# Patient Record
Sex: FEMALE | Race: WHITE | NOT HISPANIC OR LATINO | Employment: OTHER | URBAN - METROPOLITAN AREA
[De-identification: names, ages, dates, MRNs, and addresses within clinical notes are randomized per-mention and may not be internally consistent; named-entity substitution may affect disease eponyms.]

---

## 2017-07-10 ENCOUNTER — APPOINTMENT (OUTPATIENT)
Dept: PREADMISSION TESTING | Facility: HOSPITAL | Age: 77
End: 2017-07-10

## 2017-07-10 VITALS
TEMPERATURE: 98.7 F | WEIGHT: 157 LBS | OXYGEN SATURATION: 98 % | SYSTOLIC BLOOD PRESSURE: 156 MMHG | RESPIRATION RATE: 20 BRPM | HEART RATE: 74 BPM | BODY MASS INDEX: 27.82 KG/M2 | DIASTOLIC BLOOD PRESSURE: 62 MMHG | HEIGHT: 63 IN

## 2017-07-10 LAB
ANION GAP SERPL CALCULATED.3IONS-SCNC: 14.3 MMOL/L
BUN BLD-MCNC: 22 MG/DL (ref 8–23)
BUN/CREAT SERPL: 30.1 (ref 7–25)
CALCIUM SPEC-SCNC: 9.4 MG/DL (ref 8.6–10.5)
CHLORIDE SERPL-SCNC: 97 MMOL/L (ref 98–107)
CO2 SERPL-SCNC: 24.7 MMOL/L (ref 22–29)
CREAT BLD-MCNC: 0.73 MG/DL (ref 0.57–1)
DEPRECATED RDW RBC AUTO: 50.6 FL (ref 37–54)
ERYTHROCYTE [DISTWIDTH] IN BLOOD BY AUTOMATED COUNT: 17.5 % (ref 11.7–13)
GFR SERPL CREATININE-BSD FRML MDRD: 77 ML/MIN/1.73
GLUCOSE BLD-MCNC: 119 MG/DL (ref 65–99)
HCT VFR BLD AUTO: 33.1 % (ref 35.6–45.5)
HGB BLD-MCNC: 10.5 G/DL (ref 11.9–15.5)
MCH RBC QN AUTO: 25.4 PG (ref 26.9–32)
MCHC RBC AUTO-ENTMCNC: 31.7 G/DL (ref 32.4–36.3)
MCV RBC AUTO: 80 FL (ref 80.5–98.2)
PLATELET # BLD AUTO: 250 10*3/MM3 (ref 140–500)
PMV BLD AUTO: 9.2 FL (ref 6–12)
POTASSIUM BLD-SCNC: 4 MMOL/L (ref 3.5–5.2)
RBC # BLD AUTO: 4.14 10*6/MM3 (ref 3.9–5.2)
SODIUM BLD-SCNC: 136 MMOL/L (ref 136–145)
WBC NRBC COR # BLD: 8.24 10*3/MM3 (ref 4.5–10.7)

## 2017-07-10 PROCEDURE — 80048 BASIC METABOLIC PNL TOTAL CA: CPT | Performed by: ORTHOPAEDIC SURGERY

## 2017-07-10 PROCEDURE — 85027 COMPLETE CBC AUTOMATED: CPT | Performed by: ORTHOPAEDIC SURGERY

## 2017-07-10 PROCEDURE — 93005 ELECTROCARDIOGRAM TRACING: CPT

## 2017-07-10 PROCEDURE — 36415 COLL VENOUS BLD VENIPUNCTURE: CPT

## 2017-07-10 RX ORDER — HYDROCODONE BITARTRATE AND ACETAMINOPHEN 10; 325 MG/1; MG/1
1 TABLET ORAL EVERY 6 HOURS PRN
COMMUNITY
End: 2017-09-06

## 2017-07-10 RX ORDER — FUROSEMIDE 40 MG/1
40 TABLET ORAL DAILY
COMMUNITY

## 2017-07-10 RX ORDER — LEVOTHYROXINE SODIUM 88 UG/1
88 TABLET ORAL NIGHTLY
COMMUNITY

## 2017-07-10 RX ORDER — DORZOLAMIDE HYDROCHLORIDE AND TIMOLOL MALEATE 20; 5 MG/ML; MG/ML
1 SOLUTION/ DROPS OPHTHALMIC 2 TIMES DAILY
COMMUNITY

## 2017-07-10 RX ORDER — WARFARIN SODIUM 2 MG/1
2 TABLET ORAL
COMMUNITY

## 2017-07-10 RX ORDER — AMLODIPINE BESYLATE 10 MG/1
10 TABLET ORAL DAILY
COMMUNITY

## 2017-07-10 RX ORDER — LISINOPRIL 40 MG/1
40 TABLET ORAL DAILY
COMMUNITY
End: 2017-07-15 | Stop reason: HOSPADM

## 2017-07-10 RX ORDER — POTASSIUM CHLORIDE 750 MG/1
10 TABLET, EXTENDED RELEASE ORAL DAILY
COMMUNITY

## 2017-07-10 RX ORDER — OMEPRAZOLE 20 MG/1
20 CAPSULE, DELAYED RELEASE ORAL DAILY
COMMUNITY

## 2017-07-10 RX ORDER — ATORVASTATIN CALCIUM 40 MG/1
40 TABLET, FILM COATED ORAL DAILY
COMMUNITY

## 2017-07-10 RX ORDER — CARVEDILOL 3.12 MG/1
3.12 TABLET ORAL 2 TIMES DAILY WITH MEALS
COMMUNITY

## 2017-07-12 ENCOUNTER — ANESTHESIA (OUTPATIENT)
Dept: PERIOP | Facility: HOSPITAL | Age: 77
End: 2017-07-12

## 2017-07-12 ENCOUNTER — HOSPITAL ENCOUNTER (OUTPATIENT)
Facility: HOSPITAL | Age: 77
Setting detail: OBSERVATION
Discharge: HOME | End: 2017-07-15
Attending: ORTHOPAEDIC SURGERY | Admitting: ORTHOPAEDIC SURGERY

## 2017-07-12 ENCOUNTER — ANESTHESIA EVENT (OUTPATIENT)
Dept: PERIOP | Facility: HOSPITAL | Age: 77
End: 2017-07-12

## 2017-07-12 ENCOUNTER — APPOINTMENT (OUTPATIENT)
Dept: GENERAL RADIOLOGY | Facility: HOSPITAL | Age: 77
End: 2017-07-12

## 2017-07-12 DIAGNOSIS — R26.2 DIFFICULTY WALKING: Primary | ICD-10-CM

## 2017-07-12 PROBLEM — S82.899A FRACTURE, ANKLE: Status: ACTIVE | Noted: 2017-07-12

## 2017-07-12 LAB
GLUCOSE BLDC GLUCOMTR-MCNC: 102 MG/DL (ref 70–130)
GLUCOSE BLDC GLUCOMTR-MCNC: 209 MG/DL (ref 70–130)
GLUCOSE BLDC GLUCOMTR-MCNC: 63 MG/DL (ref 70–130)
GLUCOSE BLDC GLUCOMTR-MCNC: 76 MG/DL (ref 70–130)
GLUCOSE BLDC GLUCOMTR-MCNC: 85 MG/DL (ref 70–130)
INR PPP: 1.12 (ref 0.9–1.1)
PROTHROMBIN TIME: 14 SECONDS (ref 11.7–14.2)

## 2017-07-12 PROCEDURE — C1713 ANCHOR/SCREW BN/BN,TIS/BN: HCPCS | Performed by: ORTHOPAEDIC SURGERY

## 2017-07-12 PROCEDURE — 73610 X-RAY EXAM OF ANKLE: CPT

## 2017-07-12 PROCEDURE — 25010000002 MIDAZOLAM PER 1 MG: Performed by: ANESTHESIOLOGY

## 2017-07-12 PROCEDURE — 82962 GLUCOSE BLOOD TEST: CPT

## 2017-07-12 PROCEDURE — 25010000002 PHENYLEPHRINE PER 1 ML: Performed by: ANESTHESIOLOGY

## 2017-07-12 PROCEDURE — 25010000002 FENTANYL CITRATE (PF) 100 MCG/2ML SOLUTION: Performed by: ANESTHESIOLOGY

## 2017-07-12 PROCEDURE — 25010000002 ROPIVACAINE PER 1 MG: Performed by: ANESTHESIOLOGY

## 2017-07-12 PROCEDURE — 85610 PROTHROMBIN TIME: CPT | Performed by: ORTHOPAEDIC SURGERY

## 2017-07-12 PROCEDURE — 25010000002 PROPOFOL 10 MG/ML EMULSION: Performed by: ANESTHESIOLOGY

## 2017-07-12 PROCEDURE — G0378 HOSPITAL OBSERVATION PER HR: HCPCS

## 2017-07-12 PROCEDURE — 76000 FLUOROSCOPY <1 HR PHYS/QHP: CPT

## 2017-07-12 PROCEDURE — 63710000001 INSULIN DETEMER PER 5 UNITS: Performed by: ORTHOPAEDIC SURGERY

## 2017-07-12 DEVICE — IMPLANTABLE DEVICE: Type: IMPLANTABLE DEVICE | Status: FUNCTIONAL

## 2017-07-12 DEVICE — IMPLANTABLE DEVICE
Type: IMPLANTABLE DEVICE | Site: ANKLE | Status: FUNCTIONAL
Brand: EQUIVABONE ®

## 2017-07-12 RX ORDER — OXYCODONE AND ACETAMINOPHEN 7.5; 325 MG/1; MG/1
1 TABLET ORAL ONCE AS NEEDED
Status: DISCONTINUED | OUTPATIENT
Start: 2017-07-12 | End: 2017-07-12 | Stop reason: HOSPADM

## 2017-07-12 RX ORDER — PROPOFOL 10 MG/ML
VIAL (ML) INTRAVENOUS AS NEEDED
Status: DISCONTINUED | OUTPATIENT
Start: 2017-07-12 | End: 2017-07-12 | Stop reason: SURG

## 2017-07-12 RX ORDER — PROMETHAZINE HYDROCHLORIDE 25 MG/ML
12.5 INJECTION, SOLUTION INTRAMUSCULAR; INTRAVENOUS ONCE AS NEEDED
Status: DISCONTINUED | OUTPATIENT
Start: 2017-07-12 | End: 2017-07-12 | Stop reason: HOSPADM

## 2017-07-12 RX ORDER — LIDOCAINE HYDROCHLORIDE 20 MG/ML
INJECTION, SOLUTION INFILTRATION; PERINEURAL AS NEEDED
Status: DISCONTINUED | OUTPATIENT
Start: 2017-07-12 | End: 2017-07-12 | Stop reason: SURG

## 2017-07-12 RX ORDER — VANCOMYCIN HYDROCHLORIDE 1 G/200ML
1000 INJECTION, SOLUTION INTRAVENOUS ONCE
Status: CANCELLED | OUTPATIENT
Start: 2017-07-12

## 2017-07-12 RX ORDER — HYDROMORPHONE HYDROCHLORIDE 1 MG/ML
0.5 INJECTION, SOLUTION INTRAMUSCULAR; INTRAVENOUS; SUBCUTANEOUS
Status: DISCONTINUED | OUTPATIENT
Start: 2017-07-12 | End: 2017-07-12 | Stop reason: HOSPADM

## 2017-07-12 RX ORDER — LABETALOL HYDROCHLORIDE 5 MG/ML
5 INJECTION, SOLUTION INTRAVENOUS
Status: DISCONTINUED | OUTPATIENT
Start: 2017-07-12 | End: 2017-07-12 | Stop reason: HOSPADM

## 2017-07-12 RX ORDER — MORPHINE SULFATE 2 MG/ML
2 INJECTION, SOLUTION INTRAMUSCULAR; INTRAVENOUS EVERY 4 HOURS PRN
Status: DISCONTINUED | OUTPATIENT
Start: 2017-07-12 | End: 2017-07-15 | Stop reason: HOSPADM

## 2017-07-12 RX ORDER — FLUMAZENIL 0.1 MG/ML
0.2 INJECTION INTRAVENOUS AS NEEDED
Status: DISCONTINUED | OUTPATIENT
Start: 2017-07-12 | End: 2017-07-12 | Stop reason: HOSPADM

## 2017-07-12 RX ORDER — DEXTROSE MONOHYDRATE 25 G/50ML
25 INJECTION, SOLUTION INTRAVENOUS ONCE
Status: COMPLETED | OUTPATIENT
Start: 2017-07-12 | End: 2017-07-12

## 2017-07-12 RX ORDER — SODIUM CHLORIDE 0.9 % (FLUSH) 0.9 %
1-10 SYRINGE (ML) INJECTION AS NEEDED
Status: DISCONTINUED | OUTPATIENT
Start: 2017-07-12 | End: 2017-07-12 | Stop reason: HOSPADM

## 2017-07-12 RX ORDER — OXYCODONE AND ACETAMINOPHEN 7.5; 325 MG/1; MG/1
1 TABLET ORAL EVERY 4 HOURS PRN
Status: DISCONTINUED | OUTPATIENT
Start: 2017-07-12 | End: 2017-07-15 | Stop reason: HOSPADM

## 2017-07-12 RX ORDER — DORZOLAMIDE HYDROCHLORIDE AND TIMOLOL MALEATE 20; 5 MG/ML; MG/ML
1 SOLUTION/ DROPS OPHTHALMIC 2 TIMES DAILY
Status: DISCONTINUED | OUTPATIENT
Start: 2017-07-12 | End: 2017-07-15 | Stop reason: HOSPADM

## 2017-07-12 RX ORDER — SODIUM CHLORIDE, SODIUM LACTATE, POTASSIUM CHLORIDE, CALCIUM CHLORIDE 600; 310; 30; 20 MG/100ML; MG/100ML; MG/100ML; MG/100ML
9 INJECTION, SOLUTION INTRAVENOUS CONTINUOUS
Status: DISCONTINUED | OUTPATIENT
Start: 2017-07-12 | End: 2017-07-12

## 2017-07-12 RX ORDER — ROPIVACAINE HYDROCHLORIDE 5 MG/ML
INJECTION, SOLUTION EPIDURAL; INFILTRATION; PERINEURAL AS NEEDED
Status: DISCONTINUED | OUTPATIENT
Start: 2017-07-12 | End: 2017-07-12 | Stop reason: SURG

## 2017-07-12 RX ORDER — LISINOPRIL 40 MG/1
40 TABLET ORAL DAILY
Status: DISCONTINUED | OUTPATIENT
Start: 2017-07-13 | End: 2017-07-15 | Stop reason: HOSPADM

## 2017-07-12 RX ORDER — WARFARIN SODIUM 2 MG/1
2 TABLET ORAL
Status: DISCONTINUED | OUTPATIENT
Start: 2017-07-12 | End: 2017-07-15 | Stop reason: HOSPADM

## 2017-07-12 RX ORDER — PROMETHAZINE HYDROCHLORIDE 25 MG/1
25 SUPPOSITORY RECTAL ONCE AS NEEDED
Status: DISCONTINUED | OUTPATIENT
Start: 2017-07-12 | End: 2017-07-12 | Stop reason: HOSPADM

## 2017-07-12 RX ORDER — ONDANSETRON 2 MG/ML
4 INJECTION INTRAMUSCULAR; INTRAVENOUS ONCE AS NEEDED
Status: DISCONTINUED | OUTPATIENT
Start: 2017-07-12 | End: 2017-07-12 | Stop reason: HOSPADM

## 2017-07-12 RX ORDER — NALOXONE HCL 0.4 MG/ML
0.2 VIAL (ML) INJECTION AS NEEDED
Status: DISCONTINUED | OUTPATIENT
Start: 2017-07-12 | End: 2017-07-12 | Stop reason: HOSPADM

## 2017-07-12 RX ORDER — FENTANYL CITRATE 50 UG/ML
50 INJECTION, SOLUTION INTRAMUSCULAR; INTRAVENOUS
Status: DISCONTINUED | OUTPATIENT
Start: 2017-07-12 | End: 2017-07-12 | Stop reason: HOSPADM

## 2017-07-12 RX ORDER — SODIUM CHLORIDE, SODIUM LACTATE, POTASSIUM CHLORIDE, CALCIUM CHLORIDE 600; 310; 30; 20 MG/100ML; MG/100ML; MG/100ML; MG/100ML
50 INJECTION, SOLUTION INTRAVENOUS CONTINUOUS
Status: DISCONTINUED | OUTPATIENT
Start: 2017-07-12 | End: 2017-07-13

## 2017-07-12 RX ORDER — LEVOTHYROXINE SODIUM 88 UG/1
88 TABLET ORAL NIGHTLY
Status: DISCONTINUED | OUTPATIENT
Start: 2017-07-12 | End: 2017-07-15 | Stop reason: HOSPADM

## 2017-07-12 RX ORDER — MIDAZOLAM HYDROCHLORIDE 1 MG/ML
2 INJECTION INTRAMUSCULAR; INTRAVENOUS
Status: DISCONTINUED | OUTPATIENT
Start: 2017-07-12 | End: 2017-07-12 | Stop reason: HOSPADM

## 2017-07-12 RX ORDER — HYDRALAZINE HYDROCHLORIDE 20 MG/ML
5 INJECTION INTRAMUSCULAR; INTRAVENOUS
Status: DISCONTINUED | OUTPATIENT
Start: 2017-07-12 | End: 2017-07-12 | Stop reason: HOSPADM

## 2017-07-12 RX ORDER — ATORVASTATIN CALCIUM 20 MG/1
40 TABLET, FILM COATED ORAL DAILY
Status: DISCONTINUED | OUTPATIENT
Start: 2017-07-13 | End: 2017-07-15 | Stop reason: HOSPADM

## 2017-07-12 RX ORDER — HYDROCODONE BITARTRATE AND ACETAMINOPHEN 10; 325 MG/1; MG/1
1 TABLET ORAL EVERY 6 HOURS PRN
Status: DISCONTINUED | OUTPATIENT
Start: 2017-07-12 | End: 2017-07-15 | Stop reason: HOSPADM

## 2017-07-12 RX ORDER — MAGNESIUM HYDROXIDE 1200 MG/15ML
LIQUID ORAL AS NEEDED
Status: DISCONTINUED | OUTPATIENT
Start: 2017-07-12 | End: 2017-07-12 | Stop reason: HOSPADM

## 2017-07-12 RX ORDER — CARVEDILOL 3.12 MG/1
3.12 TABLET ORAL 2 TIMES DAILY WITH MEALS
Status: DISCONTINUED | OUTPATIENT
Start: 2017-07-12 | End: 2017-07-15 | Stop reason: HOSPADM

## 2017-07-12 RX ORDER — HYDROCODONE BITARTRATE AND ACETAMINOPHEN 7.5; 325 MG/1; MG/1
1 TABLET ORAL ONCE AS NEEDED
Status: DISCONTINUED | OUTPATIENT
Start: 2017-07-12 | End: 2017-07-12 | Stop reason: HOSPADM

## 2017-07-12 RX ORDER — DEXTROSE MONOHYDRATE 25 G/50ML
25 INJECTION, SOLUTION INTRAVENOUS ONCE
Status: DISCONTINUED | OUTPATIENT
Start: 2017-07-12 | End: 2017-07-12

## 2017-07-12 RX ORDER — DEXTROSE MONOHYDRATE 25 G/50ML
12.5 INJECTION, SOLUTION INTRAVENOUS AS NEEDED
Status: DISCONTINUED | OUTPATIENT
Start: 2017-07-12 | End: 2017-07-12 | Stop reason: HOSPADM

## 2017-07-12 RX ORDER — FUROSEMIDE 40 MG/1
40 TABLET ORAL DAILY
Status: DISCONTINUED | OUTPATIENT
Start: 2017-07-13 | End: 2017-07-15 | Stop reason: HOSPADM

## 2017-07-12 RX ORDER — NALOXONE HCL 0.4 MG/ML
0.4 VIAL (ML) INJECTION
Status: DISCONTINUED | OUTPATIENT
Start: 2017-07-12 | End: 2017-07-15 | Stop reason: HOSPADM

## 2017-07-12 RX ORDER — FAMOTIDINE 10 MG/ML
20 INJECTION, SOLUTION INTRAVENOUS ONCE
Status: COMPLETED | OUTPATIENT
Start: 2017-07-12 | End: 2017-07-12

## 2017-07-12 RX ORDER — ZOLPIDEM TARTRATE 5 MG/1
5 TABLET ORAL NIGHTLY PRN
Status: DISCONTINUED | OUTPATIENT
Start: 2017-07-12 | End: 2017-07-15 | Stop reason: HOSPADM

## 2017-07-12 RX ORDER — ROPIVACAINE HYDROCHLORIDE 2 MG/ML
INJECTION, SOLUTION EPIDURAL; INFILTRATION; PERINEURAL AS NEEDED
Status: DISCONTINUED | OUTPATIENT
Start: 2017-07-12 | End: 2017-07-12 | Stop reason: SURG

## 2017-07-12 RX ORDER — PANTOPRAZOLE SODIUM 40 MG/1
40 TABLET, DELAYED RELEASE ORAL EVERY MORNING
Status: DISCONTINUED | OUTPATIENT
Start: 2017-07-13 | End: 2017-07-15 | Stop reason: HOSPADM

## 2017-07-12 RX ORDER — EPHEDRINE SULFATE 50 MG/ML
5 INJECTION, SOLUTION INTRAVENOUS ONCE AS NEEDED
Status: DISCONTINUED | OUTPATIENT
Start: 2017-07-12 | End: 2017-07-12 | Stop reason: HOSPADM

## 2017-07-12 RX ORDER — MIDAZOLAM HYDROCHLORIDE 1 MG/ML
1 INJECTION INTRAMUSCULAR; INTRAVENOUS
Status: DISCONTINUED | OUTPATIENT
Start: 2017-07-12 | End: 2017-07-12 | Stop reason: HOSPADM

## 2017-07-12 RX ORDER — AMLODIPINE BESYLATE 10 MG/1
10 TABLET ORAL DAILY
Status: DISCONTINUED | OUTPATIENT
Start: 2017-07-13 | End: 2017-07-15 | Stop reason: HOSPADM

## 2017-07-12 RX ORDER — PROMETHAZINE HYDROCHLORIDE 25 MG/1
12.5 TABLET ORAL ONCE AS NEEDED
Status: DISCONTINUED | OUTPATIENT
Start: 2017-07-12 | End: 2017-07-12 | Stop reason: HOSPADM

## 2017-07-12 RX ORDER — POTASSIUM CHLORIDE 750 MG/1
10 CAPSULE, EXTENDED RELEASE ORAL DAILY
Status: DISCONTINUED | OUTPATIENT
Start: 2017-07-13 | End: 2017-07-15 | Stop reason: HOSPADM

## 2017-07-12 RX ORDER — DIPHENHYDRAMINE HYDROCHLORIDE 50 MG/ML
12.5 INJECTION INTRAMUSCULAR; INTRAVENOUS
Status: DISCONTINUED | OUTPATIENT
Start: 2017-07-12 | End: 2017-07-12 | Stop reason: HOSPADM

## 2017-07-12 RX ORDER — PROMETHAZINE HYDROCHLORIDE 25 MG/1
25 TABLET ORAL ONCE AS NEEDED
Status: DISCONTINUED | OUTPATIENT
Start: 2017-07-12 | End: 2017-07-12 | Stop reason: HOSPADM

## 2017-07-12 RX ADMIN — SODIUM CHLORIDE, POTASSIUM CHLORIDE, SODIUM LACTATE AND CALCIUM CHLORIDE 50 ML/HR: 600; 310; 30; 20 INJECTION, SOLUTION INTRAVENOUS at 23:50

## 2017-07-12 RX ADMIN — DEXTROSE MONOHYDRATE 25 ML: 25 INJECTION, SOLUTION INTRAVENOUS at 15:35

## 2017-07-12 RX ADMIN — PHENYLEPHRINE HYDROCHLORIDE 50 MCG: 10 INJECTION INTRAVENOUS at 18:19

## 2017-07-12 RX ADMIN — INSULIN DETEMIR 30 UNITS: 100 INJECTION, SOLUTION SUBCUTANEOUS at 23:45

## 2017-07-12 RX ADMIN — ROPIVACAINE HYDROCHLORIDE 10 ML: 2 INJECTION, SOLUTION EPIDURAL; INFILTRATION at 16:13

## 2017-07-12 RX ADMIN — LEVOTHYROXINE SODIUM 88 MCG: 88 TABLET ORAL at 23:49

## 2017-07-12 RX ADMIN — SODIUM CHLORIDE, POTASSIUM CHLORIDE, SODIUM LACTATE AND CALCIUM CHLORIDE: 600; 310; 30; 20 INJECTION, SOLUTION INTRAVENOUS at 17:45

## 2017-07-12 RX ADMIN — SODIUM CHLORIDE, POTASSIUM CHLORIDE, SODIUM LACTATE AND CALCIUM CHLORIDE 9 ML/HR: 600; 310; 30; 20 INJECTION, SOLUTION INTRAVENOUS at 20:33

## 2017-07-12 RX ADMIN — CARVEDILOL 3.12 MG: 3.12 TABLET, FILM COATED ORAL at 23:47

## 2017-07-12 RX ADMIN — LIDOCAINE HYDROCHLORIDE 60 MG: 20 INJECTION, SOLUTION INFILTRATION; PERINEURAL at 17:47

## 2017-07-12 RX ADMIN — WARFARIN SODIUM 2 MG: 2 TABLET ORAL at 23:49

## 2017-07-12 RX ADMIN — FENTANYL CITRATE 50 MCG: 50 INJECTION INTRAMUSCULAR; INTRAVENOUS at 17:47

## 2017-07-12 RX ADMIN — SODIUM CHLORIDE, POTASSIUM CHLORIDE, SODIUM LACTATE AND CALCIUM CHLORIDE 9 ML/HR: 600; 310; 30; 20 INJECTION, SOLUTION INTRAVENOUS at 13:46

## 2017-07-12 RX ADMIN — PROPOFOL 100 MG: 10 INJECTION, EMULSION INTRAVENOUS at 17:47

## 2017-07-12 RX ADMIN — PHENYLEPHRINE HYDROCHLORIDE 80 MCG: 10 INJECTION INTRAVENOUS at 18:43

## 2017-07-12 RX ADMIN — DEXTROSE MONOHYDRATE 12.5 G: 25 INJECTION, SOLUTION INTRAVENOUS at 13:45

## 2017-07-12 RX ADMIN — DORZOLAMIDE HYDROCHLORIDE AND TIMOLOL MALEATE 1 DROP: 20; 5 SOLUTION/ DROPS OPHTHALMIC at 23:50

## 2017-07-12 RX ADMIN — FENTANYL CITRATE 50 MCG: 50 INJECTION INTRAMUSCULAR; INTRAVENOUS at 16:03

## 2017-07-12 RX ADMIN — MIDAZOLAM 1 MG: 1 INJECTION INTRAMUSCULAR; INTRAVENOUS at 16:02

## 2017-07-12 RX ADMIN — ROPIVACAINE HYDROCHLORIDE 10 ML: 2 INJECTION, SOLUTION EPIDURAL; INFILTRATION at 16:05

## 2017-07-12 RX ADMIN — PHENYLEPHRINE HYDROCHLORIDE 50 MCG: 10 INJECTION INTRAVENOUS at 18:35

## 2017-07-12 RX ADMIN — FENTANYL CITRATE 25 MCG: 50 INJECTION INTRAMUSCULAR; INTRAVENOUS at 13:57

## 2017-07-12 RX ADMIN — MIDAZOLAM 1 MG: 1 INJECTION INTRAMUSCULAR; INTRAVENOUS at 13:48

## 2017-07-12 RX ADMIN — ROPIVACAINE HYDROCHLORIDE 10 ML: 5 INJECTION, SOLUTION EPIDURAL; INFILTRATION; PERINEURAL at 16:13

## 2017-07-12 RX ADMIN — FAMOTIDINE 20 MG: 10 INJECTION INTRAVENOUS at 13:48

## 2017-07-12 RX ADMIN — ROPIVACAINE HYDROCHLORIDE 15 ML: 5 INJECTION, SOLUTION EPIDURAL; INFILTRATION; PERINEURAL at 16:05

## 2017-07-12 NOTE — ANESTHESIA PROCEDURE NOTES
Peripheral Block    Patient location during procedure: holding area  Start time: 7/12/2017 4:01 PM  Stop time: 7/12/2017 4:06 PM  Reason for block: at surgeon's request and post-op pain management  Performed by  Anesthesiologist: CARLOS CHINCHILLA  Preanesthetic Checklist  Completed: patient identified, site marked, surgical consent, pre-op evaluation, timeout performed, IV checked, risks and benefits discussed and monitors and equipment checked  Peripheral Block Prep:  Sterile barriers:gloves  Prep: ChloraPrep  Patient monitoring: blood pressure monitoring, continuous pulse oximetry and EKG  Peripheral Procedure  Sedation:yes  Guidance:ultrasound guided  Images:still images obtained    Laterality:right  Block Type:popliteal  Injection Technique:single-shot  Needle Type:echogenic  Needle Gauge:22 G  ULTRASOUND INTERPRETATION.  Using ultrasound guidance a 22 G gauge needle was placed in close proximity to the sciatic nerve, at which point, under ultrasound guidance anesthetic was injected in the area of the nerve and spread of the anesthesia was seen on ultrasound in close proximity thereto.  There were no abnormalities seen on ultrasound; a digital image was taken; and the patient tolerated the procedure with no complications.   Medications  Local Injected:ropivacaine 0.2% and ropivacaine 0.5% Local Amount Injected:25mL  Post Assessment  Injection Assessment: negative aspiration for heme, no paresthesia on injection and incremental injection  Patient Tolerance:comfortable throughout block  Complications:no

## 2017-07-12 NOTE — PLAN OF CARE
Problem: Patient Care Overview (Adult)  Goal: Plan of Care Review  Outcome: Ongoing (interventions implemented as appropriate)    07/12/17 1312   Coping/Psychosocial Response Interventions   Plan Of Care Reviewed With patient   Patient Care Overview   Progress no change       Goal: Adult Individualization and Mutuality  Outcome: Ongoing (interventions implemented as appropriate)    07/12/17 1312   Individualization   Patient Specific Preferences PT GOES BY STIVEN       Goal: Discharge Needs Assessment  Outcome: Ongoing (interventions implemented as appropriate)    07/12/17 1312   Self-Care   Equipment Currently Used at Home shower chair;wheelchair;walker, rolling;power chair, (recliner lift);cane, straight;commode;glucometer         Problem: Perioperative Period (Adult)  Goal: Signs and Symptoms of Listed Potential Problems Will be Absent or Manageable (Perioperative Period)  Outcome: Ongoing (interventions implemented as appropriate)    07/12/17 1312   Perioperative Period   Problems Assessed (Perioperative Period) pain;infection   Problems Present (Perioperative Period) pain

## 2017-07-12 NOTE — ANESTHESIA PROCEDURE NOTES
Airway  Urgency: elective    Airway not difficult    General Information and Staff    Patient location during procedure: OR  Anesthesiologist: LOREE YANG    Indications and Patient Condition  Indications for airway management: airway protection    Preoxygenated: yes  MILS maintained throughout  Mask difficulty assessment: 1 - vent by mask    Final Airway Details  Final airway type: supraglottic airway      Successful airway: classic      Number of attempts at approach: 1

## 2017-07-12 NOTE — ANESTHESIA PREPROCEDURE EVALUATION
Anesthesia Evaluation     Patient summary reviewed          Airway   Mallampati: III  TM distance: >3 FB  Neck ROM: full  no difficulty expected  Dental    (+) upper dentures and lower dentures    Pulmonary    (+) decreased breath sounds,   Cardiovascular   Exercise tolerance: good (4-7 METS)    Rhythm: regular  Rate: normal    (+) CABG,       Neuro/Psych  GI/Hepatic/Renal/Endo      Musculoskeletal     Abdominal    Substance History      OB/GYN          Other                                        Anesthesia Plan    ASA 3     general     intravenous induction   Anesthetic plan and risks discussed with patient.

## 2017-07-12 NOTE — H&P
History & Physical       Patient: Cele Owens    Date of Admission: 7/12/2017 12:15 PM    YOB: 1940    Medical Record Number: [id number]    Attending Physician: Sriram De Luna MD        Chief Complaints: Fracture, ankle, right, closed, with nonunion, subsequent encounter [S82.321K]      History of Present Illness: 77 y.o. female presents with Fracture, ankle, right, closed, with nonunion, subsequent encounter [S82.271K]. She is s/p ORIF/ex fix, debridement and removal of some hardware.  Still with apparent non-union and some failure of implants. Patient is now being admitted to the services of Sriram De Luna MD for further evaluation and treatment.     Allergies:   Allergies   Allergen Reactions   • Aspirin Swelling     EYE SWELLING   • Iodinated Diagnostic Agents Swelling and Other (See Comments)     SWELLING, KIDNEY PROBLEMS   • Penicillins Other (See Comments)     TACHYCARDIA, LIP NUMBNESS   • Sulfa Antibiotics Nausea And Vomiting       Medications:   Home Medications:  No current facility-administered medications on file prior to encounter.      No current outpatient prescriptions on file prior to encounter.     Current Medications:  Scheduled Meds:   Continuous Infusions:  lactated ringers 9 mL/hr Last Rate: 9 mL/hr (07/12/17 1346)     PRN Meds:.•  dextrose  •  fentanyl  •  midazolam **OR** midazolam  •  sodium chloride    Past Medical History:   Diagnosis Date   • Ankle pain, right    • Arthritis    • Atrial fibrillation    • COPD (chronic obstructive pulmonary disease)     NO INHALERS   • Coronary artery disease    • Diabetes mellitus    • Frequent UTI    • GERD (gastroesophageal reflux disease)    • Glaucoma    • High cholesterol    • History of MI (myocardial infarction) 2011   • Hypertension    • Hypothyroidism    • On anticoagulant therapy         Past Surgical History:   Procedure Laterality Date   • ANKLE SURGERY Right     X4 WITH HARDWARE PLACEMENT   • APPENDECTOMY     • BLADDER  SUSPENSION     • CATARACT EXTRACTION, BILATERAL     • CHOLECYSTECTOMY     • CORONARY ARTERY BYPASS GRAFT  2011    4 VESSEL   • HYSTERECTOMY     • ORIF ANKLE FRACTURE Right    • PARTIAL HIP ARTHROPLASTY Right 2014   • SPINE SURGERY  03/2016   • TONSILLECTOMY     • TOTAL HIP ARTHROPLASTY Right 2016        Social History     Occupational History   • Not on file.     Social History Main Topics   • Smoking status: Former Smoker     Packs/day: 1.00     Years: 30.00     Types: Cigarettes     Quit date: 2011   • Smokeless tobacco: Not on file   • Alcohol use No   • Drug use: No   • Sexual activity: Not on file    Social History     Social History Narrative        Family History   Problem Relation Age of Onset   • Malig Hyperthermia Neg Hx          Review of Systems:   HEENT: Patient denies any headaches, vision changes, change in hearing, or tinnitus, Patient denies any rhinorrhea,epistaxis, sinus pain, mouth or dental problems, sore throat or hoarseness, or dysphagia  Pulmonary: Patient denies any cough, congestion, SOA, or wheezing  Cardiovascular: Patient denies any chest pain, dyspnea, palpitations, weakness, intolerance of exercise, varicosities, swelling of extremities, known murmur  Gastrointestinal:  Patient denies nausea, vomiting, diarrhea, constipation, loss  of appetite, change in appetite, dysphagia, gas, heartburn, melena, change in bowel habits, use of laxatives or other drugs to alter the function of the gastrointestinal tract.  Genital/Urinary: Patient denies dysuria, change in color of urine, change in frequency of urination, pain with urgency, incontinence, retention, or nocturia.  Musculoskeletal: Patient denies increased warmth; redness; or swelling of joints; limitation of function; deformity; crepitation: pain in a joint or an extremity, the neck, or the back, especially with movement.  Neurological: Patient denies dizziness, tremor, ataxia, difficulty in speaking, change in speech, paresthesia, loss  of sensation, seizures, syncope, changes in memory.  Endocrine system: Patient denies tremors, palpitations, intolerance of heat or cold, polyuria, polydipsia, polyphagia, diaphoresis, exophthalmos, or goiter.  Psychological: Patient denies thoughts/plans or harming self or other; depression,  insomnia, night terrors, terrance, memory loss, disorientation.  Skin: Patient denies any bruising, rashes, discoloration, pruritus, wounds, ulcers, decubiti, changes in the hair or nails  Hematopoietic: Patient denies history of spontaneous or excessive bleeding, epistaxis, hematuria, melena, fatigue, enlarged or tender lymph nodes, pallor, history of anemia.    Physical Exam: 77 y.o. female  General Appearance:    Alert, cooperative, in no acute distress                    Vitals:    07/12/17 1239 07/12/17 1352   BP: 102/62    BP Location: Right arm    Patient Position: Lying    Pulse: 75 72   Resp: 20    Temp: 98.3 °F (36.8 °C)    TempSrc: Oral    SpO2: 98% 98%        Head:    Normocephalic, without obvious abnormality, atraumatic   Eyes:            Lids and lashes normal, conjunctivae and sclerae normal, no   icterus, no pallor, corneas clear, PERRLA   Ears:    Ears appear intact with no abnormalities noted   Throat:   No oral lesions, no thrush, oral mucosa moist   Neck:   No adenopathy, supple, trachea midline, no thyromegaly, no   carotid bruit, no JVD   Back:     No kyphosis present, no scoliosis present, no skin lesions,      erythema or scars, no tenderness to percussion or                   palpation,   range of motion normal   Lungs:     Clear to auscultation,respirations regular, even and                  unlabored    Heart:    Regular rhythm and normal rate, normal S1 and S2, no            murmur, no gallop, no rub, no click   Chest Wall:    No abnormalities observed   Abdomen:     Normal bowel sounds, no masses, no organomegaly, soft        non-tender, non-distended, no guarding, no rebound                 tenderness   Rectal:     Deferred   Extremities:   Tenderness over lateral ankle; some varus. Loss of moderate dorsiflexion and plantar flexion; no drainage; wounds healedMoves all extremities well, no edema,   no cyanosis, no redness   Pulses:   Pulses palpable and equal bilaterally   Skin:   No bleeding, bruising or rash   Lymph nodes:   No palpable adenopathy   Neurologic:   Cranial nerves 2 - 12 grossly intact, sensation intact, DTR       present and equal bilaterally           Diagnostic Tests:  [unfilled]      [unfilled]    Assessment:  Patient Active Problem List   Diagnosis   • Fracture, ankle           Plan:  The patient voiced understanding of the risks, benefits, and alternative forms of treatment that were discussed and the patient consents to proceed with removal of failed implants and repair non-union medial malleolus.       Discharge Plan: tomorrow or following day to home      Date: [unfilled]  Sriram De Luna MD

## 2017-07-12 NOTE — ANESTHESIA PROCEDURE NOTES
Peripheral Block    Patient location during procedure: holding area  Start time: 7/12/2017 4:07 PM  Stop time: 7/12/2017 4:15 PM  Reason for block: at surgeon's request and post-op pain management  Performed by  Anesthesiologist: CARLOS CHINCHILLA  Preanesthetic Checklist  Completed: patient identified, site marked, surgical consent, pre-op evaluation, timeout performed, IV checked, risks and benefits discussed and monitors and equipment checked  Peripheral Block Prep:  Sterile barriers:gloves  Prep: ChloraPrep  Patient monitoring: blood pressure monitoring, continuous pulse oximetry and EKG  Peripheral Procedure  Sedation:yes  Guidance:ultrasound guided  Images:still images obtained    Laterality:right  Block Type:saphenous  Injection Technique:single-shot  Needle Type:echogenic  Needle Gauge:22 G  ULTRASOUND INTERPRETATION.  Using ultrasound guidance a 22 G gauge needle was placed in close proximity to the femoral nerve, at which point, under ultrasound guidance anesthetic was injected in the area of the nerve and spread of the anesthesia was seen on ultrasound in close proximity thereto.  There were no abnormalities seen on ultrasound; a digital image was taken; and the patient tolerated the procedure with no complications.   Medications  Local Injected:ropivacaine 0.2% and ropivacaine 0.5% Local Amount Injected:20mL  Post Assessment  Injection Assessment: negative aspiration for heme, no paresthesia on injection and incremental injection  Patient Tolerance:comfortable throughout block  Complications:no

## 2017-07-13 PROBLEM — R26.2 DIFFICULTY WALKING: Status: ACTIVE | Noted: 2017-07-13

## 2017-07-13 LAB
ANION GAP SERPL CALCULATED.3IONS-SCNC: 13.5 MMOL/L
BASOPHILS # BLD AUTO: 0 10*3/MM3 (ref 0–0.2)
BASOPHILS NFR BLD AUTO: 0 % (ref 0–1.5)
BUN BLD-MCNC: 23 MG/DL (ref 8–23)
BUN/CREAT SERPL: 27.4 (ref 7–25)
CALCIUM SPEC-SCNC: 8.6 MG/DL (ref 8.6–10.5)
CHLORIDE SERPL-SCNC: 97 MMOL/L (ref 98–107)
CO2 SERPL-SCNC: 23.5 MMOL/L (ref 22–29)
CREAT BLD-MCNC: 0.84 MG/DL (ref 0.57–1)
DEPRECATED RDW RBC AUTO: 50.2 FL (ref 37–54)
EOSINOPHIL # BLD AUTO: 0 10*3/MM3 (ref 0–0.7)
EOSINOPHIL NFR BLD AUTO: 0 % (ref 0.3–6.2)
ERYTHROCYTE [DISTWIDTH] IN BLOOD BY AUTOMATED COUNT: 17.4 % (ref 11.7–13)
GFR SERPL CREATININE-BSD FRML MDRD: 66 ML/MIN/1.73
GLUCOSE BLD-MCNC: 244 MG/DL (ref 65–99)
GLUCOSE BLDC GLUCOMTR-MCNC: 144 MG/DL (ref 70–130)
GLUCOSE BLDC GLUCOMTR-MCNC: 169 MG/DL (ref 70–130)
GLUCOSE BLDC GLUCOMTR-MCNC: 185 MG/DL (ref 70–130)
GLUCOSE BLDC GLUCOMTR-MCNC: 218 MG/DL (ref 70–130)
HBA1C MFR BLD: 6.51 % (ref 4.8–5.6)
HCT VFR BLD AUTO: 30.4 % (ref 35.6–45.5)
HGB BLD-MCNC: 9.5 G/DL (ref 11.9–15.5)
IMM GRANULOCYTES # BLD: 0.03 10*3/MM3 (ref 0–0.03)
IMM GRANULOCYTES NFR BLD: 0.3 % (ref 0–0.5)
INR PPP: 1.08 (ref 0.9–1.1)
LYMPHOCYTES # BLD AUTO: 0.65 10*3/MM3 (ref 0.9–4.8)
LYMPHOCYTES NFR BLD AUTO: 6.1 % (ref 19.6–45.3)
MCH RBC QN AUTO: 24.9 PG (ref 26.9–32)
MCHC RBC AUTO-ENTMCNC: 31.3 G/DL (ref 32.4–36.3)
MCV RBC AUTO: 79.6 FL (ref 80.5–98.2)
MONOCYTES # BLD AUTO: 0.57 10*3/MM3 (ref 0.2–1.2)
MONOCYTES NFR BLD AUTO: 5.3 % (ref 5–12)
NEUTROPHILS # BLD AUTO: 9.48 10*3/MM3 (ref 1.9–8.1)
NEUTROPHILS NFR BLD AUTO: 88.3 % (ref 42.7–76)
PLATELET # BLD AUTO: 246 10*3/MM3 (ref 140–500)
PMV BLD AUTO: 9.4 FL (ref 6–12)
POTASSIUM BLD-SCNC: 4.2 MMOL/L (ref 3.5–5.2)
PROTHROMBIN TIME: 13.6 SECONDS (ref 11.7–14.2)
RBC # BLD AUTO: 3.82 10*6/MM3 (ref 3.9–5.2)
SODIUM BLD-SCNC: 134 MMOL/L (ref 136–145)
WBC NRBC COR # BLD: 10.73 10*3/MM3 (ref 4.5–10.7)

## 2017-07-13 PROCEDURE — 85025 COMPLETE CBC W/AUTO DIFF WBC: CPT | Performed by: ORTHOPAEDIC SURGERY

## 2017-07-13 PROCEDURE — 80048 BASIC METABOLIC PNL TOTAL CA: CPT | Performed by: ORTHOPAEDIC SURGERY

## 2017-07-13 PROCEDURE — G8980 MOBILITY D/C STATUS: HCPCS

## 2017-07-13 PROCEDURE — 82962 GLUCOSE BLOOD TEST: CPT

## 2017-07-13 PROCEDURE — G8978 MOBILITY CURRENT STATUS: HCPCS

## 2017-07-13 PROCEDURE — G0378 HOSPITAL OBSERVATION PER HR: HCPCS

## 2017-07-13 PROCEDURE — 94799 UNLISTED PULMONARY SVC/PX: CPT

## 2017-07-13 PROCEDURE — 63710000001 INSULIN DETEMER PER 5 UNITS: Performed by: ORTHOPAEDIC SURGERY

## 2017-07-13 PROCEDURE — 83036 HEMOGLOBIN GLYCOSYLATED A1C: CPT | Performed by: INTERNAL MEDICINE

## 2017-07-13 PROCEDURE — G8979 MOBILITY GOAL STATUS: HCPCS

## 2017-07-13 PROCEDURE — 85610 PROTHROMBIN TIME: CPT | Performed by: ORTHOPAEDIC SURGERY

## 2017-07-13 PROCEDURE — 97110 THERAPEUTIC EXERCISES: CPT

## 2017-07-13 PROCEDURE — 25010000002 MORPHINE PER 10 MG: Performed by: ORTHOPAEDIC SURGERY

## 2017-07-13 PROCEDURE — 97162 PT EVAL MOD COMPLEX 30 MIN: CPT

## 2017-07-13 RX ORDER — ACETAMINOPHEN 160 MG/5ML
500 SOLUTION ORAL EVERY 6 HOURS PRN
Status: DISCONTINUED | OUTPATIENT
Start: 2017-07-13 | End: 2017-07-13

## 2017-07-13 RX ADMIN — DORZOLAMIDE HYDROCHLORIDE AND TIMOLOL MALEATE 1 DROP: 20; 5 SOLUTION/ DROPS OPHTHALMIC at 20:07

## 2017-07-13 RX ADMIN — CARVEDILOL 3.12 MG: 3.12 TABLET, FILM COATED ORAL at 09:05

## 2017-07-13 RX ADMIN — HYDROCODONE BITARTRATE AND ACETAMINOPHEN 1 TABLET: 10; 325 TABLET ORAL at 10:56

## 2017-07-13 RX ADMIN — OXYCODONE HYDROCHLORIDE AND ACETAMINOPHEN 1 TABLET: 7.5; 325 TABLET ORAL at 21:05

## 2017-07-13 RX ADMIN — PANTOPRAZOLE SODIUM 40 MG: 40 TABLET, DELAYED RELEASE ORAL at 09:05

## 2017-07-13 RX ADMIN — LEVOTHYROXINE SODIUM 88 MCG: 88 TABLET ORAL at 20:07

## 2017-07-13 RX ADMIN — AMLODIPINE BESYLATE 10 MG: 10 TABLET ORAL at 09:05

## 2017-07-13 RX ADMIN — WARFARIN SODIUM 2 MG: 2 TABLET ORAL at 17:21

## 2017-07-13 RX ADMIN — FUROSEMIDE 40 MG: 40 TABLET ORAL at 09:05

## 2017-07-13 RX ADMIN — POTASSIUM CHLORIDE 10 MEQ: 750 CAPSULE, EXTENDED RELEASE ORAL at 09:05

## 2017-07-13 RX ADMIN — CARVEDILOL 3.12 MG: 3.12 TABLET, FILM COATED ORAL at 17:21

## 2017-07-13 RX ADMIN — INSULIN DETEMIR 30 UNITS: 100 INJECTION, SOLUTION SUBCUTANEOUS at 21:46

## 2017-07-13 RX ADMIN — ATORVASTATIN CALCIUM 40 MG: 20 TABLET, FILM COATED ORAL at 20:06

## 2017-07-13 RX ADMIN — OXYCODONE HYDROCHLORIDE AND ACETAMINOPHEN 1 TABLET: 7.5; 325 TABLET ORAL at 16:39

## 2017-07-13 RX ADMIN — INSULIN DETEMIR 30 UNITS: 100 INJECTION, SOLUTION SUBCUTANEOUS at 09:02

## 2017-07-13 RX ADMIN — DORZOLAMIDE HYDROCHLORIDE AND TIMOLOL MALEATE 1 DROP: 20; 5 SOLUTION/ DROPS OPHTHALMIC at 09:05

## 2017-07-13 RX ADMIN — METFORMIN HYDROCHLORIDE 1000 MG: 1000 TABLET ORAL at 09:05

## 2017-07-13 RX ADMIN — LISINOPRIL 40 MG: 40 TABLET ORAL at 09:05

## 2017-07-13 RX ADMIN — MORPHINE SULFATE 2 MG: 2 INJECTION, SOLUTION INTRAMUSCULAR; INTRAVENOUS at 19:50

## 2017-07-13 NOTE — SIGNIFICANT NOTE
07/13/17 1023   Rehab Treatment   Discipline physical therapist   Rehab Evaluation   Evaluation Not Performed patient/family declined evaluation  (Pt requests PT to wait on eval this am due to diarrhea. WIll follow up later today.)   Recommendation   PT - Next Appointment 07/13/17

## 2017-07-13 NOTE — ANESTHESIA POSTPROCEDURE EVALUATION
Patient: Cele Owens    Procedure Summary     Date Anesthesia Start Anesthesia Stop Room / Location    07/12/17 1744 [facility] / [facility]       Procedure Diagnosis Surgeon Provider    HARDWARE REMOVAL WITH FIXATION MEDIAL MALLELOUS RIGHT  (Right Ankle) No diagnosis on file. MD Bud Lancaster MD          Anesthesia Type: general  Last vitals  /44 (07/12/17 2000)    Temp 37.2 °C (98.9 °F) (07/12/17 1944)    Pulse 73 (07/12/17 2000)   Resp 16 (07/12/17 2000)    SpO2 99 % (07/12/17 2000)      Post Anesthesia Care and Evaluation    Patient location during evaluation: PACU  Patient participation: complete - patient participated  Level of consciousness: awake and alert  Pain management: adequate  Airway patency: patent  Anesthetic complications: No anesthetic complications    Cardiovascular status: acceptable  Respiratory status: acceptable  Hydration status: acceptable

## 2017-07-13 NOTE — PLAN OF CARE
Problem: Patient Care Overview (Adult)  Goal: Plan of Care Review  Outcome: Ongoing (interventions implemented as appropriate)    07/13/17 0438   Coping/Psychosocial Response Interventions   Plan Of Care Reviewed With patient   Patient Care Overview   Progress progress toward functional goals as expected   Outcome Evaluation   Outcome Summary/Follow up Plan PAIN UNDER CONTROL. UP ASSIST X1. VSS. EDUCATION PROVIDED ON THE SIGNS AND SYMPTOMS OF COPD LIKE WHEEZING AND SHORTNESS OF AIR.       Goal: Adult Individualization and Mutuality  Outcome: Ongoing (interventions implemented as appropriate)  Goal: Discharge Needs Assessment  Outcome: Ongoing (interventions implemented as appropriate)    Problem: Perioperative Period (Adult)  Goal: Signs and Symptoms of Listed Potential Problems Will be Absent or Manageable (Perioperative Period)  Outcome: Ongoing (interventions implemented as appropriate)    Problem: Fall Risk (Adult)  Goal: Identify Related Risk Factors and Signs and Symptoms  Outcome: Outcome(s) achieved Date Met:  07/13/17  Goal: Absence of Falls  Outcome: Ongoing (interventions implemented as appropriate)    Problem: Pain, Acute (Adult)  Goal: Identify Related Risk Factors and Signs and Symptoms  Outcome: Outcome(s) achieved Date Met:  07/13/17  Goal: Acceptable Pain Control/Comfort Level  Outcome: Ongoing (interventions implemented as appropriate)

## 2017-07-13 NOTE — PROGRESS NOTES
"    Orthopedic Progress Note      Patient: Cele Owens    Date of Admission: 7/12/2017 12:15 PM    YOB: 1940    Medical Record Number: [id number]    Attending Physician: Sriram De Luna MD    Post Operative Day Number: 1    Status Post: HARDWARE REMOVAL WITH FIXATION MEDIAL MALLELOUS RIGHT      Current Medications:  Scheduled Meds:  amLODIPine 10 mg Oral Daily   atorvastatin 40 mg Oral Daily   carvedilol 3.125 mg Oral BID With Meals   dorzolamide-timolol 1 drop Both Eyes BID   furosemide 40 mg Oral Daily   insulin detemir 30 Units Subcutaneous Q12H   levothyroxine 88 mcg Oral Nightly   lisinopril 40 mg Oral Daily   pantoprazole 40 mg Oral QAM   potassium chloride 10 mEq Oral Daily   warfarin 2 mg Oral Daily     Continuous Infusions:   PRN Meds:.HYDROcodone-acetaminophen  •  Morphine **AND** naloxone  •  oxyCODONE-acetaminophen  •  zolpidem      Physical Exam: 77 y.o. female  General Appearance:    Alert, cooperative, in no acute distress                 Vitals:    07/13/17 0719 07/13/17 1157 07/13/17 1212 07/13/17 1553   BP: 171/68 125/57  158/61   BP Location: Right arm Left arm  Right arm   Patient Position: Lying Sitting  Lying   Pulse: 80 80  85   Resp: 16 16  18   Temp: 100.5 °F (38.1 °C) 99.4 °F (37.4 °C) Comment: encouraged IS every 2 hours 98.7 °F (37.1 °C)   TempSrc: Oral Oral  Oral   SpO2: 100% 100%  99%   Weight: 157 lb (71.2 kg)      Height: 63\" (160 cm)           Extremities:   Dressing Dry and Intact    Incision intact without signs or symptoms of infections   Pulses:   Pulses palpable and equal bilaterally   Skin:   No bleeding, bruising or rash       Diagnostic Tests:    Admission on 07/12/2017   Component Date Value Ref Range Status   • Glucose 07/12/2017 63* 70 - 130 mg/dL Final   • Protime 07/12/2017 14.0  11.7 - 14.2 Seconds Final   • INR 07/12/2017 1.12* 0.90 - 1.10 Final   • Glucose 07/12/2017 102  70 - 130 mg/dL Final   • Glucose 07/12/2017 76  70 - 130 mg/dL Final   • Glucose " 07/12/2017 85  70 - 130 mg/dL Final   • Glucose 07/12/2017 209* 70 - 130 mg/dL Final   • Glucose 07/13/2017 244* 65 - 99 mg/dL Final   • BUN 07/13/2017 23  8 - 23 mg/dL Final   • Creatinine 07/13/2017 0.84  0.57 - 1.00 mg/dL Final   • Sodium 07/13/2017 134* 136 - 145 mmol/L Final   • Potassium 07/13/2017 4.2  3.5 - 5.2 mmol/L Final   • Chloride 07/13/2017 97* 98 - 107 mmol/L Final   • CO2 07/13/2017 23.5  22.0 - 29.0 mmol/L Final   • Calcium 07/13/2017 8.6  8.6 - 10.5 mg/dL Final   • eGFR Non African Amer 07/13/2017 66  >60 mL/min/1.73 Final   • BUN/Creatinine Ratio 07/13/2017 27.4* 7.0 - 25.0 Final   • Anion Gap 07/13/2017 13.5  mmol/L Final   • Protime 07/13/2017 13.6  11.7 - 14.2 Seconds Final   • INR 07/13/2017 1.08  0.90 - 1.10 Final   • WBC 07/13/2017 10.73* 4.50 - 10.70 10*3/mm3 Final   • RBC 07/13/2017 3.82* 3.90 - 5.20 10*6/mm3 Final   • Hemoglobin 07/13/2017 9.5* 11.9 - 15.5 g/dL Final   • Hematocrit 07/13/2017 30.4* 35.6 - 45.5 % Final   • MCV 07/13/2017 79.6* 80.5 - 98.2 fL Final   • MCH 07/13/2017 24.9* 26.9 - 32.0 pg Final   • MCHC 07/13/2017 31.3* 32.4 - 36.3 g/dL Final   • RDW 07/13/2017 17.4* 11.7 - 13.0 % Final   • RDW-SD 07/13/2017 50.2  37.0 - 54.0 fl Final   • MPV 07/13/2017 9.4  6.0 - 12.0 fL Final   • Platelets 07/13/2017 246  140 - 500 10*3/mm3 Final   • Neutrophil % 07/13/2017 88.3* 42.7 - 76.0 % Final   • Lymphocyte % 07/13/2017 6.1* 19.6 - 45.3 % Final   • Monocyte % 07/13/2017 5.3  5.0 - 12.0 % Final   • Eosinophil % 07/13/2017 0.0* 0.3 - 6.2 % Final   • Basophil % 07/13/2017 0.0  0.0 - 1.5 % Final   • Immature Grans % 07/13/2017 0.3  0.0 - 0.5 % Final   • Neutrophils, Absolute 07/13/2017 9.48* 1.90 - 8.10 10*3/mm3 Final   • Lymphocytes, Absolute 07/13/2017 0.65* 0.90 - 4.80 10*3/mm3 Final   • Monocytes, Absolute 07/13/2017 0.57  0.20 - 1.20 10*3/mm3 Final   • Eosinophils, Absolute 07/13/2017 0.00  0.00 - 0.70 10*3/mm3 Final   • Basophils, Absolute 07/13/2017 0.00  0.00 - 0.20 10*3/mm3  Final   • Immature Grans, Absolute 07/13/2017 0.03  0.00 - 0.03 10*3/mm3 Final   • Glucose 07/13/2017 218* 70 - 130 mg/dL Final   • Glucose 07/13/2017 144* 70 - 130 mg/dL Final   • Hemoglobin A1C 07/13/2017 6.51* 4.80 - 5.60 % Final   • Glucose 07/13/2017 169* 70 - 130 mg/dL Final       Xr Ankle 3+ View Right    Result Date: 7/12/2017  Narrative: 3 VIEW PORTABLE RIGHT ANKLE  HISTORY: Internal fixation of fracture.  FINDINGS: Imaging in the operating room shows 3 screws transfixing a fracture of the medial malleolus. 3 images were obtained and the fluoroscopy time measures 42 seconds.  This report was finalized on 7/12/2017 8:33 PM by Dr. Kyle Galeas MD.      Fl C Arm During Surgery    Result Date: 7/12/2017  Narrative: This procedure was auto-finalized with no dictation required.      Assessment:  Patient Active Problem List   Diagnosis   • Fracture, ankle   • Difficulty walking           Plan:    Continue Pain management efforts  Continue mobilization efforts  Continue incisional care  Hospitalists following for diarrhea      Discharge Plan: tomorrow to home if diarrhea resolved      Date: 7/13/2017    Sriram De Luna MD

## 2017-07-13 NOTE — BRIEF OP NOTE
ANKLE OPEN REDUCTION INTERNAL FIXATION  Procedure Note    Cele Owens  7/12/2017    Pre-op Diagnosis:   Non-union right ankle fracture; failure of fixation    Post-op Diagnosis:     Same    Procedure/CPT® Codes: 23763      Procedure(s):  HARDWARE REMOVAL TIBIA AND TALUS WITH REPAIR NONUNION MEDIAL MALLELOUS WITH INTERNAL FIXATION AND GRAFT (EQUIVABONE)    Surgeon(s):  Sriram De Luna MD    Anesthesia: General    Staff:   Circulator: Erendira Davis RN; Yash Patino RN  Radiology Technologist: Mckenzie Gonsales  Scrub Person: Blair Mac; Shari Batres    Estimated Blood Loss: *No blood loss documented*  Urine Voided: * No values recorded between 7/12/2017  5:40 PM and 7/12/2017  7:42 PM *    Specimens:                * No specimens in log *      Drains:           Findings: DICTATED    Complications: NONE      Sriram De Luna MD     Date: 7/12/2017  Time: 8:12 PM

## 2017-07-13 NOTE — CONSULTS
Patient Identification:  Name: Cele Owens  Age/Sex: 77 y.o. female  :  1940  MRN: [id number]         Primary Care Physician: Cedric Ramirez MD  Room:  P798/1              Date of Consult: 17    Requesting Physician: Dr. Sriram De Luna    Reason for Consultation: Management of diabetes and other issues as noted    HPI: This is a 77-year-old woman whom we are asked to see for management of underlying diabetes and heart disease.  She has been having diarrhea today and has had 6 stools so far.  This morning she said she had some fever and what sounds like chills.  She had nausea this morning did not eat breakfast.  She did eat lunch.  No abdominal pain.  No dizziness or lightheadedness.  She sometimes has loose bowel movements depending on what she eats, for example onions will cause loose bowel movements but not usually as bad as what's going on today  Regarding her diabetes, its very well controlled.  She checks it twice a day.  Sugars run between 90 and 100.  Her sugar was low preoperatively.  If her sugars are low, she gets shaky and sweaty.  Occasionally she'll have dry mouth.  No polyuria or polydipsia.  She has paresthesias in her fingers at times but she attributes that to carpal tunnel.    Past Medical History:   Diagnosis Date   • Ankle pain, right    • Arthritis    • Atrial fibrillation    • COPD (chronic obstructive pulmonary disease)     NO INHALERS   • Coronary artery disease    • Diabetes mellitus    • Frequent UTI    • GERD (gastroesophageal reflux disease)    • Glaucoma    • High cholesterol    • History of MI (myocardial infarction)    • Hypertension    • Hypothyroidism    • On anticoagulant therapy        Past Surgical History:   Procedure Laterality Date   • ANKLE OPEN REDUCTION INTERNAL FIXATION Right 2017    Procedure: HARDWARE REMOVAL WITH FIXATION MEDIAL MALLELOUS RIGHT ;  Surgeon: Sriram De Luna MD;  Location: [facility];  Service:    • ANKLE SURGERY Right      X4 WITH HARDWARE PLACEMENT   • APPENDECTOMY     • BLADDER SUSPENSION     • CATARACT EXTRACTION, BILATERAL     • CHOLECYSTECTOMY     • CORONARY ARTERY BYPASS GRAFT  2011    4 VESSEL   • HYSTERECTOMY     • ORIF ANKLE FRACTURE Right    • PARTIAL HIP ARTHROPLASTY Right 2014   • SPINE SURGERY  03/2016   • TONSILLECTOMY     • TOTAL HIP ARTHROPLASTY Right 2016       Prescriptions Prior to Admission   Medication Sig Dispense Refill Last Dose   • amLODIPine (NORVASC) 10 MG tablet Take 10 mg by mouth Daily.   7/12/2017 at 0700   • atorvastatin (LIPITOR) 40 MG tablet Take 40 mg by mouth Daily.   7/11/2017 at 0900   • carvedilol (COREG) 3.125 MG tablet Take 3.125 mg by mouth 2 (Two) Times a Day With Meals.   7/12/2017 at 0700   • dorzolamide-timolol (COSOPT) 22.3-6.8 MG/ML ophthalmic solution Administer 1 drop to both eyes 2 (Two) Times a Day.   7/12/2017 at 0700   • furosemide (LASIX) 40 MG tablet Take 40 mg by mouth Daily.   7/11/2017 at 0900   • HYDROcodone-acetaminophen (NORCO)  MG per tablet Take 1 tablet by mouth Every 6 (Six) Hours As Needed for Moderate Pain (4-6).   7/11/2017 at 2200   • Insulin Glargine (LANTUS SOLOSTAR) 100 UNIT/ML injection pen Inject 35 Units under the skin 2 (Two) Times a Day.   7/11/2017 at 2200   • levothyroxine (SYNTHROID, LEVOTHROID) 88 MCG tablet Take 88 mcg by mouth Every Night.   7/11/2017 at 2200   • lisinopril (PRINIVIL,ZESTRIL) 40 MG tablet Take 40 mg by mouth Daily.   7/11/2017 at 0900   • metFORMIN (GLUCOPHAGE) 1000 MG tablet Take 1,000 mg by mouth Daily With Breakfast.   7/11/2017 at 0900   • omeprazole (priLOSEC) 20 MG capsule Take 20 mg by mouth Daily.   7/11/2017 at 0900   • potassium chloride (K-DUR,KLOR-CON) 10 MEQ CR tablet Take 10 mEq by mouth Daily.   7/11/2017 at 0900   • warfarin (COUMADIN) 2 MG tablet Take 2 mg by mouth Daily. HELD FOR OR   7/6/2017     Allergies:  Aspirin; Iodinated diagnostic agents; Penicillins; and Sulfa antibiotics    Social History    Substance Use Topics   • Smoking status: Former Smoker     Packs/day: 1.00     Years: 30.00     Types: Cigarettes     Quit date: 2011   • Smokeless tobacco: None   • Alcohol use No   .  Lives with her .  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  She also owned a ceramics shop.    Family History   Problem Relation Age of Onset   • Malig Hyperthermia Neg Hx    Positive for diabetes in several family members.  Her brother and father had stomach cancer.  A cousin had a stroke.  Her father and brother had heart disease    Review of Systems   Constitutional: Positive for activity change, appetite change, chills, fever and unexpected weight change. Negative for diaphoresis.        She has lost about 50 pounds.  This started earlier this year with ankle surgery.  She just wasn't hungry and was satisfied with less.  Weight loss has plateaued over the last several weeks   HENT: Positive for voice change. Negative for congestion, hearing loss, sore throat and trouble swallowing.         Voice has been hoarse since her heart surgery when she extubated herself   Eyes: Negative for visual disturbance.   Respiratory: Negative for cough, choking, chest tightness, shortness of breath, wheezing and stridor.    Cardiovascular: Positive for leg swelling. Negative for chest pain and palpitations.        She has a little bit of swelling in the left ankle but it's not been any worse than usual.  Knees are achy especially the right  Had palpitations until her heart rate medication was increased   Gastrointestinal: Positive for diarrhea and nausea. Negative for abdominal pain, constipation and vomiting.   Endocrine: Negative for polydipsia, polyphagia and polyuria.   Genitourinary: Negative for difficulty urinating and dysuria.   Musculoskeletal: Positive for arthralgias and joint swelling. Negative for back pain.        Right ankle swelling   Neurological: Positive for numbness. Negative for dizziness, tremors, syncope and  weakness.   Psychiatric/Behavioral: Negative for agitation and behavioral problems.          Vital Signs  Temp:  [96.3 °F (35.7 °C)-100.5 °F (38.1 °C)] 99.4 °F (37.4 °C)  Heart Rate:  [66-86] 80  Resp:  [15-21] 16  BP: (125-171)/(44-73) 125/57  Body mass index is 27.81 kg/(m^2).    Physical Exam   Constitutional: She is oriented to person, place, and time. She appears well-developed and well-nourished. No distress.   Looks younger than her age.  Pleasant and cooperative   HENT:   Head: Normocephalic and atraumatic.   Right Ear: External ear normal.   Left Ear: External ear normal.   Nose: Nose normal.   Mouth/Throat: Oropharynx is clear and moist. No oropharyngeal exudate.   Upper dentures   Eyes: Conjunctivae and EOM are normal. Pupils are equal, round, and reactive to light. Right eye exhibits no discharge. Left eye exhibits no discharge. No scleral icterus.   Neck: Normal range of motion. Neck supple. No tracheal deviation present. No thyromegaly present.   Transmission of heart murmur to the right neck   Cardiovascular: Normal rate, regular rhythm and intact distal pulses.  Exam reveals no friction rub.    Murmur heard.  Unable to check pedal pulses on the right secondary to the cast.  Grade 3/6 systolic murmur best heard right upper sternal border   Pulmonary/Chest: No respiratory distress. She has no wheezes. She has no rales. She exhibits no tenderness.   Breath sounds equal and clear though diminished   Abdominal: Soft. Bowel sounds are normal. She exhibits no distension and no mass. There is no tenderness.   Obese.  No hepatosplenomegaly   Musculoskeletal: She exhibits edema.   Trace edema at the left ankle.  Right leg has a cast.  Minimal thickening of the PIP joints.   strength is decreased but equal.  Tinel's sign is negative   Lymphadenopathy:     She has no cervical adenopathy.   Neurological: She is alert and oriented to person, place, and time. No cranial nerve deficit.   Sensation decreased in  the distal left lower extremity to the plastic filament   Skin: Skin is warm. She is not diaphoretic.   Psychiatric: She has a normal mood and affect.   Nursing note and vitals reviewed.      Results Review:    I reviewed the patient's new clinical results.    Assessment/Plan  1.  Diarrhea.  Because she had fever and chills this morning, I have ordered a C. difficile study.  She was on prolonged IV antibiotics earlier this year because of the ankle.  I have stopped the metformin.  2.  Diabetes mellitus type 2.  Her sugars at home are actually low.  I have ordered an A1c.  Stop metformin for now.  We'll continue with the Levemir.  That dosage had already been decreased because of hypoglycemia at home.  3.  Hypertension.  I wrote parameters for which to hold lisinopril.  I note she is on Norvasc, Lasix and potassium.  Volume status appears fine.    4.  Coronary artery disease.  Stable.  5.  Hyperlipidemia.  She is on a statin    Thank you very much for asking [facility] to be involved in this patient's care.  We will follow along with you.      Heidi Holguin MD  [facility]  07/13/17  1:46 PM

## 2017-07-13 NOTE — OP NOTE
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     ANESTHESIA: General  Anesthesiologist: Rose Marie Esqueda MD; Bud Jensen MD       ESTIMATED BLOOD LOSS:  Less than 100  .   SPECIMENS:   Order Name Source Comment Collection Info Order Time   PROTIME-INR   Collected By: Andre Coates RN 7/12/2017  1:17 PM   BASIC METABOLIC PANEL   Collected By: Renata Rodriguez 7/13/2017 12:01 AM   PROTIME-INR   Collected By: Renata Rodriguez 7/13/2017 12:01 AM    Is the Patient on Coumadin (Warfarin) therapy?  Yes            COMPLICATIONS: Nil.     DRAINS: none          SURGEON: Sriram De Luna M.D.    ASSISTANTS: none    INDICATIONS: The patient is a 77 y.o. female  who presented to the office with a history of injury and complex ankle fracture.  She has undergone external fixation, subsequent ORIF and debridement with removal of some of the hardware.  She has shown further lack of union of the medial malleolus and some of the talus fracture, with ongoing pain in the ankle. The patient elected to proceed with an open reduction internal fixation with repair of the non-union and removal of existing hardware. Likely risks and benefits of the procedure including, but not limited to infection, malunion, nonunion, posttraumatic stiffness, posttraumatic arthritis, possibility of injury to nerves, vessels or tendons have been discussed in detail. Despite the risks involved, the patient elected to proceed and informed consent was obtained and was scheduled for surgery. The patient was seen in the  preoperative holding area and the operative site was marked.    PROCEDURE: The patient has been transferred to [facility] operating room. Preoperative antibiotic VAncomycin 1gm gm Intravenously was given prior to the placement of tourniquet . After achieving adequate anesthesia, a well-padded tourniquet was placed over the proximal aspect of the operative thigh. The leg was prepped and draped in the usual sterile fashion. Surgical timeout was done. Correct patient surgical side and site were identified. Tourniquet was elevated to a pressure of 250 mmHg.     A skin incision was made on the medial malleolus centering over the fracture site, through the previous incision. The skin and subcutaneous tissue were incised and elevated off of the medial malleolus, exposing the non-union at the the fracture site.  The cannulated screw was identified and removed without difficulty.  The fibrous non-union tissue was taken down with rongeurs and osteotomes. The medial malleolus was then reflected distally and the talus exposed. The headless compression screws were then removed from the talus with the appropriate screwdriver, and further fibrous tissue removed from the dome of the talus. The ankle was then inspected with the C-arm, and the medial fragment placed in position.  The reduction of the medial malleolus was found to be satisfactory. The position was maintained with a drill bit, and was found to be satisfactory.     Fixation was then performed with 3.5mm screws x 3 of measured length, obtaining lateral cortical fixation. Equivibone 5cc was packed into the fracture site prior to compression fixation. Final C-arm images were obtained and saved.    The sponge count and needle count were found to be correct. The incision was thoroughly irrigated and closed in layers.  Sterile dressings were placed and the patient was transferred to the recovery room in a stable condition with a well-padded posterior splint.   The patient tolerated the procedure well.  There were no complications.     I discussed the satisfactory performance of the procedure with the patient's family and discussed with them The postoperative management.     7/13/2017  7:34 PM  Sriram De Luna MD    CC: Cedric Ramirez MD; MD Sriram Bentley MD

## 2017-07-13 NOTE — PLAN OF CARE
Problem: Patient Care Overview (Adult)  Goal: Plan of Care Review  Outcome: Ongoing (interventions implemented as appropriate)    07/13/17 1676   Coping/Psychosocial Response Interventions   Plan Of Care Reviewed With patient   Patient Care Overview   Progress improving   Outcome Evaluation   Outcome Summary/Follow up Plan block has worn off able to wiggle toes now, pain tolerable with PO pain meds, able to pivot to chair and bsc with walker and 1 assist, vital signs educated patient on blood pressure monitoring at home, patient verblized understanding.         Problem: Fall Risk (Adult)  Goal: Absence of Falls  Outcome: Ongoing (interventions implemented as appropriate)    Problem: Pain, Acute (Adult)  Goal: Acceptable Pain Control/Comfort Level  Outcome: Ongoing (interventions implemented as appropriate)    Problem: Mobility, Physical Impaired (Adult)  Goal: Enhanced Mobility Skills  Outcome: Ongoing (interventions implemented as appropriate)  Goal: Enhanced Functionality Ability  Outcome: Ongoing (interventions implemented as appropriate)

## 2017-07-13 NOTE — PLAN OF CARE
Problem: Patient Care Overview (Adult)  Goal: Plan of Care Review  Outcome: Outcome(s) achieved Date Met:  07/13/17 07/13/17 1144   Coping/Psychosocial Response Interventions   Plan Of Care Reviewed With patient   Outcome Evaluation   Outcome Summary/Follow up Plan Pt doing well, presents s/p R ankle hardware removal. Pt doing well with transfers and ambulating short distances. Pt states she has all the equipment she needs and does not have to use steps at home. PT will sign off at this itme.

## 2017-07-13 NOTE — THERAPY DISCHARGE NOTE
Acute Care - Physical Therapy Initial Eval/Discharge  [facility]     Patient Name: Cele Owens  : 1940  MRN: [id number]  Today's Date: 2017   Onset of Illness/Injury or Date of Surgery Date: 17  Date of Referral to PT: 17  Referring Physician: Calixto      Admit Date: 2017    Visit Dx:    ICD-10-CM ICD-9-CM   1. Difficulty walking R26.2 719.7     Patient Active Problem List   Diagnosis   • Fracture, ankle     Past Medical History:   Diagnosis Date   • Ankle pain, right    • Arthritis    • Atrial fibrillation    • COPD (chronic obstructive pulmonary disease)     NO INHALERS   • Coronary artery disease    • Diabetes mellitus    • Frequent UTI    • GERD (gastroesophageal reflux disease)    • Glaucoma    • High cholesterol    • History of MI (myocardial infarction)    • Hypertension    • Hypothyroidism    • On anticoagulant therapy      Past Surgical History:   Procedure Laterality Date   • ANKLE OPEN REDUCTION INTERNAL FIXATION Right 2017    Procedure: HARDWARE REMOVAL WITH FIXATION MEDIAL MALLELOUS RIGHT ;  Surgeon: Sriram De Luna MD;  Location: [facility];  Service:    • ANKLE SURGERY Right     X4 WITH HARDWARE PLACEMENT   • APPENDECTOMY     • BLADDER SUSPENSION     • CATARACT EXTRACTION, BILATERAL     • CHOLECYSTECTOMY     • CORONARY ARTERY BYPASS GRAFT      4 VESSEL   • HYSTERECTOMY     • ORIF ANKLE FRACTURE Right    • PARTIAL HIP ARTHROPLASTY Right    • SPINE SURGERY  2016   • TONSILLECTOMY     • TOTAL HIP ARTHROPLASTY Right 2016          PT ASSESSMENT (last 72 hours)      PT Evaluation       17 1130 17 1023    Rehab Evaluation    Document Type evaluation;discharge summary  -EJ     Subjective Information agree to therapy  -EJ     Evaluation Not Performed  patient/family declined evaluation   Pt requests PT to wait on eval this am due to diarrhea. WIll follow up later today.  -EJ    Patient Effort, Rehab Treatment good  -EJ     Symptoms Noted  During/After Treatment none  -EJ     General Information    Onset of Illness/Injury or Date of Surgery Date 07/12/17  -EJ     Referring Physician Calixto  -EJ     General Observations sitting up on EOB after using BSC  -EJ     Precautions/Limitations non-weight bearing status   NWB R foot  -EJ     Prior Level of Function independent:;all household mobility;community mobility;ADL's  -EJ     Plans/Goals Discussed With patient;agreed upon  -EJ     Barriers to Rehab none identified  -EJ     Living Environment    Lives With spouse  -EJ     Living Arrangements house  -EJ     Home Accessibility --   lift chair  -EJ     Clinical Impression    Date of Referral to PT 07/13/17  -EJ     PT Diagnosis s/p R ankle hardware removal  -EJ     Patient/Family Goals Statement Pt plans home either today or tomorrow  -EJ     Criteria for Skilled Therapeutic Interventions Met no problems identified which require skilled intervention  -EJ     Rehab Potential good, to achieve stated therapy goals  -EJ     Pain Assessment    Pain Assessment 0-10  -EJ     Pain Score 3  -EJ     Pain Location Ankle  -EJ     Pain Orientation Right  -EJ     Cognitive Assessment/Intervention    Current Cognitive/Communication Assessment functional  -EJ     Orientation Status oriented x 4  -EJ     Follows Commands/Answers Questions 100% of the time  -EJ     Personal Safety WNL/WFL  -EJ     Personal Safety Interventions fall prevention program maintained;gait belt;nonskid shoes/slippers when out of bed;supervised activity  -EJ     ROM (Range of Motion)    General ROM no range of motion deficits identified   x R ankle  -EJ     MMT (Manual Muscle Testing)    General MMT Assessment no strength deficits identified   x R ankle  -EJ     Mobility Assessment/Training    Extremity Weight-Bearing Status right lower extremity  -EJ     Right Lower Extremity Weight-Bearing non weight-bearing  -EJ     Bed Mobility, Assessment/Treatment    Bed Mob, Supine to Sit, [place] not  tested  -EJ     Bed Mob, Sit to Supine, [place] not tested  -EJ     Transfer Assessment/Treatment    Transfers, Sit-Stand [place] verbal cues required;contact guard assist  -EJ     Transfers, Stand-Sit [place] contact guard assist  -EJ     Transfers, Sit-Stand-Sit, Assist Device rolling walker  -EJ     Gait Assessment/Treatment    Gait, [place] Level verbal cues required;contact guard assist  -EJ     Gait, Assistive Device rolling walker  -EJ     Gait, Distance (Feet) 10  -EJ     Gait, Gait Deviations tera decreased;step length decreased  -EJ     Gait, Maintain Weight Bearing Status able to maintain weight bearing status  -EJ     Gait, Comment able to use rolling walker to ambulate to chair  -EJ     Positioning and Restraints    Pre-Treatment Position in bed  -EJ     Post Treatment Position chair  -EJ     In Chair notified nsg;reclined;call light within reach;encouraged to call for assist  -EJ       07/13/17 0438 07/12/17 1312    General Information    Equipment Currently Used at Home walker, standard  -QN shower chair;wheelchair;walker, rolling;power chair, (recliner lift);cane, straight;commode;glucometer  -TG    Living Environment    Transportation Available car;family or friend will provide  -QN       07/12/17 1300 07/10/17 1437    General Information    Equipment Currently Used at Home walker, rolling;cane, straight;glucometer;wheelchair  -TG glucometer;walker, standard  -JL    Living Environment    Lives With spouse  -TG spouse  -JL    Living Arrangements house  -TG house  -JL    Home Accessibility no concerns;bed and bath on same level;bed and bath are not on the first floor;stairs to enter home;stairs within home  -TG no concerns  -JL    Number of Stairs to Enter Home 1  -TG     Number of Stairs Within Home 12   PT HAS AN ACORN CHAIR LIFT  -TG     Stair Railings at Home inside, present at both sides;outside, present at both sides  -TG inside, present at both sides  -JL    Type of  Financial/Environmental Concern none  -TG none  -JL    Transportation Available car  -TG car  -JL      User Key  (r) = Recorded By, (t) = Taken By, (c) = Cosigned By    Initials Name Provider Type    ART Hardin, RN Registered Nurse    ELENA Coates, RN Registered Nurse    KAREEM France, RN Registered Nurse    DAMION Barron PT Physical Therapist              PT Recommendation and Plan  Anticipated Discharge Disposition: home with assist  PT Frequency: evaluation only  Plan of Care Review  Plan Of Care Reviewed With: patient  Outcome Summary/Follow up Plan: Pt doing well, presents s/p R ankle hardware removal. Pt doing well with transfers and ambulating short distances. Pt states she has all the euipment she needs and does not have to use steps at  home. PT will sign off at this itme.              Outcome Measures       07/13/17 1100          How much help from another person do you currently need...    Turning from your back to your side while in flat bed without using bedrails? 4  -EJ      Moving from lying on back to sitting on the side of a flat bed without bedrails? 4  -EJ      Moving to and from a bed to a chair (including a wheelchair)? 3  -EJ      Standing up from a chair using your arms (e.g., wheelchair, bedside chair)? 3  -EJ      Climbing 3-5 steps with a railing? 3  -EJ      To walk in hospital room? 3  -EJ      AM-PAC 6 Clicks Score 20  -EJ      Functional Assessment    Outcome Measure Options AM-PAC 6 Clicks Basic Mobility (PT)  -EJ        User Key  (r) = Recorded By, (t) = Taken By, (c) = Cosigned By    Initials Name Provider Type    DAMION Barron PT Physical Therapist           Time Calculation:         PT Charges       07/13/17 1146 07/13/17 1023       Time Calculation    Start Time 1130  -EJ      Stop Time 1142  -EJ      Time Calculation (min) 12 min  -EJ      PT Received On 07/13/17  -DAMION      PT - Next Appointment  07/13/17  -DAMION       User Key  (r) = Recorded By, (t) =  Taken By, (c) = Cosigned By    Initials Name Provider Type     Ember Barron, PT Physical Therapist          Therapy Charges for Today     Code Description Service Date Service Provider Modifiers Qty    74469772405 HC PT EVAL MOD COMPLEXITY 1 7/13/2017 Ember Barron, PT GP 1    25781319364 HC PT THER PROC EA 15 MIN 7/13/2017 Ember Barron, PT GP 1    16520913603 HC PT THER SUPP EA 15 MIN 7/13/2017 Ember Barron, PT GP 1          PT G-Codes  Outcome Measure Options: AM-PAC 6 Clicks Basic Mobility (PT)    PT Discharge Summary  Anticipated Discharge Disposition: home with assist  Reason for Discharge: Independent, Discharge from facility  Discharge Destination: Home with assist    Ember Barron, PT  7/13/2017

## 2017-07-13 NOTE — PLAN OF CARE
Problem: Mobility, Physical Impaired (Adult)  Goal: Identify Related Risk Factors and Signs and Symptoms  Outcome: Outcome(s) achieved Date Met:  07/13/17

## 2017-07-14 ENCOUNTER — APPOINTMENT (OUTPATIENT)
Dept: GENERAL RADIOLOGY | Facility: HOSPITAL | Age: 77
End: 2017-07-14

## 2017-07-14 LAB
ANION GAP SERPL CALCULATED.3IONS-SCNC: 12.7 MMOL/L
BACTERIA UR QL AUTO: ABNORMAL /HPF
BILIRUB UR QL STRIP: NEGATIVE
BUN BLD-MCNC: 18 MG/DL (ref 8–23)
BUN/CREAT SERPL: 24.7 (ref 7–25)
CALCIUM SPEC-SCNC: 8.4 MG/DL (ref 8.6–10.5)
CHLORIDE SERPL-SCNC: 96 MMOL/L (ref 98–107)
CLARITY UR: CLEAR
CO2 SERPL-SCNC: 24.3 MMOL/L (ref 22–29)
COLOR UR: YELLOW
CREAT BLD-MCNC: 0.73 MG/DL (ref 0.57–1)
DEPRECATED RDW RBC AUTO: 52.5 FL (ref 37–54)
ERYTHROCYTE [DISTWIDTH] IN BLOOD BY AUTOMATED COUNT: 17.6 % (ref 11.7–13)
GFR SERPL CREATININE-BSD FRML MDRD: 77 ML/MIN/1.73
GLUCOSE BLD-MCNC: 92 MG/DL (ref 65–99)
GLUCOSE BLDC GLUCOMTR-MCNC: 108 MG/DL (ref 70–130)
GLUCOSE BLDC GLUCOMTR-MCNC: 207 MG/DL (ref 70–130)
GLUCOSE BLDC GLUCOMTR-MCNC: 282 MG/DL (ref 70–130)
GLUCOSE BLDC GLUCOMTR-MCNC: 76 MG/DL (ref 70–130)
GLUCOSE UR STRIP-MCNC: NEGATIVE MG/DL
HCT VFR BLD AUTO: 28.2 % (ref 35.6–45.5)
HGB BLD-MCNC: 8.9 G/DL (ref 11.9–15.5)
HGB UR QL STRIP.AUTO: ABNORMAL
HYALINE CASTS UR QL AUTO: ABNORMAL /LPF
KETONES UR QL STRIP: NEGATIVE
LEUKOCYTE ESTERASE UR QL STRIP.AUTO: ABNORMAL
MCH RBC QN AUTO: 25.6 PG (ref 26.9–32)
MCHC RBC AUTO-ENTMCNC: 31.6 G/DL (ref 32.4–36.3)
MCV RBC AUTO: 81 FL (ref 80.5–98.2)
NITRITE UR QL STRIP: NEGATIVE
PH UR STRIP.AUTO: 6 [PH] (ref 5–8)
PLATELET # BLD AUTO: 208 10*3/MM3 (ref 140–500)
PMV BLD AUTO: 9.4 FL (ref 6–12)
POTASSIUM BLD-SCNC: 3.3 MMOL/L (ref 3.5–5.2)
PROT UR QL STRIP: ABNORMAL
RBC # BLD AUTO: 3.48 10*6/MM3 (ref 3.9–5.2)
RBC # UR: ABNORMAL /HPF
REF LAB TEST METHOD: ABNORMAL
SODIUM BLD-SCNC: 133 MMOL/L (ref 136–145)
SP GR UR STRIP: 1.01 (ref 1–1.03)
SQUAMOUS #/AREA URNS HPF: ABNORMAL /HPF
UROBILINOGEN UR QL STRIP: ABNORMAL
WBC NRBC COR # BLD: 9.91 10*3/MM3 (ref 4.5–10.7)
WBC UR QL AUTO: ABNORMAL /HPF

## 2017-07-14 PROCEDURE — 25010000002 MORPHINE PER 10 MG: Performed by: ORTHOPAEDIC SURGERY

## 2017-07-14 PROCEDURE — 25010000002 MORPHINE SULFATE (PF) 2 MG/ML SOLUTION: Performed by: ORTHOPAEDIC SURGERY

## 2017-07-14 PROCEDURE — 80048 BASIC METABOLIC PNL TOTAL CA: CPT | Performed by: INTERNAL MEDICINE

## 2017-07-14 PROCEDURE — 94799 UNLISTED PULMONARY SVC/PX: CPT

## 2017-07-14 PROCEDURE — 85027 COMPLETE CBC AUTOMATED: CPT | Performed by: INTERNAL MEDICINE

## 2017-07-14 PROCEDURE — G0378 HOSPITAL OBSERVATION PER HR: HCPCS

## 2017-07-14 PROCEDURE — 71010 HC CHEST PA OR AP: CPT

## 2017-07-14 PROCEDURE — 82962 GLUCOSE BLOOD TEST: CPT

## 2017-07-14 PROCEDURE — 81001 URINALYSIS AUTO W/SCOPE: CPT | Performed by: INTERNAL MEDICINE

## 2017-07-14 PROCEDURE — 63710000001 INSULIN DETEMER PER 5 UNITS: Performed by: INTERNAL MEDICINE

## 2017-07-14 PROCEDURE — 87086 URINE CULTURE/COLONY COUNT: CPT | Performed by: INTERNAL MEDICINE

## 2017-07-14 RX ORDER — DOXYCYCLINE 100 MG/1
100 CAPSULE ORAL EVERY 12 HOURS SCHEDULED
Status: DISCONTINUED | OUTPATIENT
Start: 2017-07-14 | End: 2017-07-15 | Stop reason: HOSPADM

## 2017-07-14 RX ORDER — POTASSIUM CHLORIDE 750 MG/1
20 CAPSULE, EXTENDED RELEASE ORAL ONCE
Status: DISCONTINUED | OUTPATIENT
Start: 2017-07-14 | End: 2017-07-15 | Stop reason: HOSPADM

## 2017-07-14 RX ORDER — OXYCODONE AND ACETAMINOPHEN 7.5; 325 MG/1; MG/1
1 TABLET ORAL EVERY 4 HOURS PRN
Qty: 80 TABLET | Refills: 0 | Status: SHIPPED | OUTPATIENT
Start: 2017-07-14 | End: 2017-07-22

## 2017-07-14 RX ADMIN — POTASSIUM CHLORIDE 10 MEQ: 750 CAPSULE, EXTENDED RELEASE ORAL at 09:18

## 2017-07-14 RX ADMIN — DORZOLAMIDE HYDROCHLORIDE AND TIMOLOL MALEATE 1 DROP: 20; 5 SOLUTION/ DROPS OPHTHALMIC at 09:18

## 2017-07-14 RX ADMIN — MORPHINE SULFATE 2 MG: 2 INJECTION, SOLUTION INTRAMUSCULAR; INTRAVENOUS at 11:59

## 2017-07-14 RX ADMIN — AMLODIPINE BESYLATE 10 MG: 10 TABLET ORAL at 09:18

## 2017-07-14 RX ADMIN — ATORVASTATIN CALCIUM 40 MG: 20 TABLET, FILM COATED ORAL at 09:19

## 2017-07-14 RX ADMIN — LISINOPRIL 40 MG: 40 TABLET ORAL at 09:19

## 2017-07-14 RX ADMIN — MORPHINE SULFATE 2 MG: 2 INJECTION, SOLUTION INTRAMUSCULAR; INTRAVENOUS at 00:41

## 2017-07-14 RX ADMIN — MORPHINE SULFATE 2 MG: 2 INJECTION, SOLUTION INTRAMUSCULAR; INTRAVENOUS at 16:50

## 2017-07-14 RX ADMIN — INSULIN DETEMIR 28 UNITS: 100 INJECTION, SOLUTION SUBCUTANEOUS at 21:27

## 2017-07-14 RX ADMIN — CARVEDILOL 3.12 MG: 3.12 TABLET, FILM COATED ORAL at 09:19

## 2017-07-14 RX ADMIN — DORZOLAMIDE HYDROCHLORIDE AND TIMOLOL MALEATE 1 DROP: 20; 5 SOLUTION/ DROPS OPHTHALMIC at 16:53

## 2017-07-14 RX ADMIN — OXYCODONE HYDROCHLORIDE AND ACETAMINOPHEN 1 TABLET: 7.5; 325 TABLET ORAL at 19:28

## 2017-07-14 RX ADMIN — OXYCODONE HYDROCHLORIDE AND ACETAMINOPHEN 1 TABLET: 7.5; 325 TABLET ORAL at 14:06

## 2017-07-14 RX ADMIN — PANTOPRAZOLE SODIUM 40 MG: 40 TABLET, DELAYED RELEASE ORAL at 07:04

## 2017-07-14 RX ADMIN — HYDROCODONE BITARTRATE AND ACETAMINOPHEN 1 TABLET: 10; 325 TABLET ORAL at 01:24

## 2017-07-14 RX ADMIN — DOXYCYCLINE 100 MG: 100 CAPSULE ORAL at 21:30

## 2017-07-14 RX ADMIN — WARFARIN SODIUM 2 MG: 2 TABLET ORAL at 16:53

## 2017-07-14 RX ADMIN — LEVOTHYROXINE SODIUM 88 MCG: 88 TABLET ORAL at 21:30

## 2017-07-14 RX ADMIN — OXYCODONE HYDROCHLORIDE AND ACETAMINOPHEN 1 TABLET: 7.5; 325 TABLET ORAL at 09:18

## 2017-07-14 RX ADMIN — CARVEDILOL 3.12 MG: 3.12 TABLET, FILM COATED ORAL at 16:53

## 2017-07-14 RX ADMIN — OXYCODONE HYDROCHLORIDE AND ACETAMINOPHEN 1 TABLET: 7.5; 325 TABLET ORAL at 04:09

## 2017-07-14 RX ADMIN — FUROSEMIDE 40 MG: 40 TABLET ORAL at 09:19

## 2017-07-14 RX ADMIN — INSULIN DETEMIR 30 UNITS: 100 INJECTION, SOLUTION SUBCUTANEOUS at 09:20

## 2017-07-14 NOTE — PROGRESS NOTES
Orthopedic Progress Note      Patient: Cele Owens    Date of Admission: 7/12/2017 12:15 PM    YOB: 1940    Medical Record Number: [id number]    Attending Physician: Sriram De Luna MD    Post Operative Day Number: 1    Status Post: HARDWARE REMOVAL WITH FIXATION MEDIAL MALLELOUS RIGHT      Current Medications:  Scheduled Meds:  amLODIPine 10 mg Oral Daily   atorvastatin 40 mg Oral Daily   carvedilol 3.125 mg Oral BID With Meals   dorzolamide-timolol 1 drop Both Eyes BID   furosemide 40 mg Oral Daily   insulin detemir 28 Units Subcutaneous Nightly   [START ON 7/15/2017] insulin detemir 30 Units Subcutaneous QAM   levothyroxine 88 mcg Oral Nightly   lisinopril 40 mg Oral Daily   pantoprazole 40 mg Oral QAM   potassium chloride 10 mEq Oral Daily   potassium chloride 20 mEq Oral Once   warfarin 2 mg Oral Daily     Continuous Infusions:   PRN Meds:.HYDROcodone-acetaminophen  •  Morphine **AND** naloxone  •  oxyCODONE-acetaminophen  •  zolpidem      Physical Exam: 77 y.o. female  General Appearance:    Alert, cooperative, in no acute distress                 Vitals:    07/14/17 0300 07/14/17 0700 07/14/17 1100 07/14/17 1500   BP: 134/48 160/50 118/52 140/60   BP Location: Right arm Right arm Right arm Right arm   Patient Position: Lying Lying Lying Lying   Pulse: 71 72 70 90   Resp: 18 18 18 18   Temp: 99 °F (37.2 °C) 99.3 °F (37.4 °C) (!) 100.6 °F (38.1 °C) (!) 100.6 °F (38.1 °C)   TempSrc: Oral Oral Oral Oral   SpO2: 91% 92% 90% 90%   Weight:       Height:            Extremities:   Dressing Dry and Intact    Incision intact without signs or symptoms of infections   Pulses:   Pulses palpable and equal bilaterally   Skin:   No bleeding, bruising or rash       Diagnostic Tests:    Admission on 07/12/2017   Component Date Value Ref Range Status   • Glucose 07/12/2017 63* 70 - 130 mg/dL Final   • Protime 07/12/2017 14.0  11.7 - 14.2 Seconds Final   • INR 07/12/2017 1.12* 0.90 - 1.10 Final   • Glucose  07/12/2017 102  70 - 130 mg/dL Final   • Glucose 07/12/2017 76  70 - 130 mg/dL Final   • Glucose 07/12/2017 85  70 - 130 mg/dL Final   • Glucose 07/12/2017 209* 70 - 130 mg/dL Final   • Glucose 07/13/2017 244* 65 - 99 mg/dL Final   • BUN 07/13/2017 23  8 - 23 mg/dL Final   • Creatinine 07/13/2017 0.84  0.57 - 1.00 mg/dL Final   • Sodium 07/13/2017 134* 136 - 145 mmol/L Final   • Potassium 07/13/2017 4.2  3.5 - 5.2 mmol/L Final   • Chloride 07/13/2017 97* 98 - 107 mmol/L Final   • CO2 07/13/2017 23.5  22.0 - 29.0 mmol/L Final   • Calcium 07/13/2017 8.6  8.6 - 10.5 mg/dL Final   • eGFR Non African Amer 07/13/2017 66  >60 mL/min/1.73 Final   • BUN/Creatinine Ratio 07/13/2017 27.4* 7.0 - 25.0 Final   • Anion Gap 07/13/2017 13.5  mmol/L Final   • Protime 07/13/2017 13.6  11.7 - 14.2 Seconds Final   • INR 07/13/2017 1.08  0.90 - 1.10 Final   • WBC 07/13/2017 10.73* 4.50 - 10.70 10*3/mm3 Final   • RBC 07/13/2017 3.82* 3.90 - 5.20 10*6/mm3 Final   • Hemoglobin 07/13/2017 9.5* 11.9 - 15.5 g/dL Final   • Hematocrit 07/13/2017 30.4* 35.6 - 45.5 % Final   • MCV 07/13/2017 79.6* 80.5 - 98.2 fL Final   • MCH 07/13/2017 24.9* 26.9 - 32.0 pg Final   • MCHC 07/13/2017 31.3* 32.4 - 36.3 g/dL Final   • RDW 07/13/2017 17.4* 11.7 - 13.0 % Final   • RDW-SD 07/13/2017 50.2  37.0 - 54.0 fl Final   • MPV 07/13/2017 9.4  6.0 - 12.0 fL Final   • Platelets 07/13/2017 246  140 - 500 10*3/mm3 Final   • Neutrophil % 07/13/2017 88.3* 42.7 - 76.0 % Final   • Lymphocyte % 07/13/2017 6.1* 19.6 - 45.3 % Final   • Monocyte % 07/13/2017 5.3  5.0 - 12.0 % Final   • Eosinophil % 07/13/2017 0.0* 0.3 - 6.2 % Final   • Basophil % 07/13/2017 0.0  0.0 - 1.5 % Final   • Immature Grans % 07/13/2017 0.3  0.0 - 0.5 % Final   • Neutrophils, Absolute 07/13/2017 9.48* 1.90 - 8.10 10*3/mm3 Final   • Lymphocytes, Absolute 07/13/2017 0.65* 0.90 - 4.80 10*3/mm3 Final   • Monocytes, Absolute 07/13/2017 0.57  0.20 - 1.20 10*3/mm3 Final   • Eosinophils, Absolute 07/13/2017  0.00  0.00 - 0.70 10*3/mm3 Final   • Basophils, Absolute 07/13/2017 0.00  0.00 - 0.20 10*3/mm3 Final   • Immature Grans, Absolute 07/13/2017 0.03  0.00 - 0.03 10*3/mm3 Final   • Glucose 07/13/2017 218* 70 - 130 mg/dL Final   • Glucose 07/13/2017 144* 70 - 130 mg/dL Final   • Hemoglobin A1C 07/13/2017 6.51* 4.80 - 5.60 % Final   • Glucose 07/13/2017 169* 70 - 130 mg/dL Final   • Glucose 07/13/2017 185* 70 - 130 mg/dL Final   • Glucose 07/14/2017 92  65 - 99 mg/dL Final   • BUN 07/14/2017 18  8 - 23 mg/dL Final   • Creatinine 07/14/2017 0.73  0.57 - 1.00 mg/dL Final   • Sodium 07/14/2017 133* 136 - 145 mmol/L Final   • Potassium 07/14/2017 3.3* 3.5 - 5.2 mmol/L Final   • Chloride 07/14/2017 96* 98 - 107 mmol/L Final   • CO2 07/14/2017 24.3  22.0 - 29.0 mmol/L Final   • Calcium 07/14/2017 8.4* 8.6 - 10.5 mg/dL Final   • eGFR Non African Amer 07/14/2017 77  >60 mL/min/1.73 Final   • BUN/Creatinine Ratio 07/14/2017 24.7  7.0 - 25.0 Final   • Anion Gap 07/14/2017 12.7  mmol/L Final   • WBC 07/14/2017 9.91  4.50 - 10.70 10*3/mm3 Final   • RBC 07/14/2017 3.48* 3.90 - 5.20 10*6/mm3 Final   • Hemoglobin 07/14/2017 8.9* 11.9 - 15.5 g/dL Final   • Hematocrit 07/14/2017 28.2* 35.6 - 45.5 % Final   • MCV 07/14/2017 81.0  80.5 - 98.2 fL Final   • MCH 07/14/2017 25.6* 26.9 - 32.0 pg Final   • MCHC 07/14/2017 31.6* 32.4 - 36.3 g/dL Final   • RDW 07/14/2017 17.6* 11.7 - 13.0 % Final   • RDW-SD 07/14/2017 52.5  37.0 - 54.0 fl Final   • MPV 07/14/2017 9.4  6.0 - 12.0 fL Final   • Platelets 07/14/2017 208  140 - 500 10*3/mm3 Final   • Glucose 07/14/2017 76  70 - 130 mg/dL Final   • Glucose 07/14/2017 108  70 - 130 mg/dL Final   • Glucose 07/14/2017 207* 70 - 130 mg/dL Final       Xr Ankle 3+ View Right    Result Date: 7/12/2017  Narrative: 3 VIEW PORTABLE RIGHT ANKLE  HISTORY: Internal fixation of fracture.  FINDINGS: Imaging in the operating room shows 3 screws transfixing a fracture of the medial malleolus. 3 images were obtained and  the fluoroscopy time measures 42 seconds.  This report was finalized on 7/12/2017 8:33 PM by Dr. Kyle Galeas MD.      Xr Chest 1 View    Result Date: 7/14/2017  Narrative: ONE VIEW PORTABLE CHEST  HISTORY: Recent foot surgery. Fever.  The lungs are well-expanded and clear. The heart is top normal in size with sternal wires from previous cardiac surgery. No acute abnormality is seen.  This report was finalized on 7/14/2017 5:00 PM by Dr. Kyle Galeas MD.      Fl C Arm During Surgery    Result Date: 7/12/2017  Narrative: This procedure was auto-finalized with no dictation required.      Assessment:  Patient Active Problem List   Diagnosis   • Fracture, ankle   • Difficulty walking           Plan:    Continue Pain management efforts  Continue mobilization efforts  Continue splint        Discharge Plan: tomorrow to home if she remains afebrile without signs of infection      Date: 7/14/2017    Sriram De Luna MD

## 2017-07-14 NOTE — PROGRESS NOTES
"     LOS: 1 day   Primary Care Physician: Cedric Ramirez MD     Subjective   Fever of 101.3 at 8 PM yesterday.  No cough.  No dysuria.  Doesn't feel good today.  Having a lot of pain at the right ankle.  Did not have any more diarrhea after I saw her yesterday.  No abdominal pain.  No nausea or vomiting.  She does not feel like she could manage at home because the pain is so severe.    Vital Signs  Body mass index is 27.81 kg/(m^2).  Temp:  [98.3 °F (36.8 °C)-101.6 °F (38.7 °C)] 99.3 °F (37.4 °C)  Heart Rate:  [71-85] 72  Resp:  [16-18] 18  BP: (125-160)/(48-61) 160/50      Objective:  General Appearance:  Uncomfortable and in no acute distress.    Vital signs: (most recent): Blood pressure 160/50, pulse 72, temperature 99.3 °F (37.4 °C), temperature source Oral, resp. rate 18, height 63\" (160 cm), weight 157 lb (71.2 kg), SpO2 92 %.    HEENT: (No JVD.  Neck supple.  No lymphadenopathy.  Trachea midline)    Lungs:  There are decreased breath sounds.  No wheezes, rales or rhonchi.    Heart: Normal rate.  Regular rhythm.  No murmur.   Abdomen: Abdomen is soft and non-distended.  Bowel sounds are normal.   There is no abdominal tenderness.   There is no splenomegaly. There is no hepatomegaly.   Extremities: (No edema left ankle.  Right leg cast)  Neurological: Patient is alert.          Results Review:    I reviewed the patient's new clinical results.      Results from last 7 days  Lab Units 07/14/17 0429 07/13/17  0329   WBC 10*3/mm3 9.91 10.73*   HEMOGLOBIN g/dL 8.9* 9.5*   PLATELETS 10*3/mm3 208 246       Results from last 7 days  Lab Units 07/14/17 0429 07/13/17 0329   SODIUM mmol/L 133* 134*   POTASSIUM mmol/L 3.3* 4.2   CHLORIDE mmol/L 96* 97*   CO2 mmol/L 24.3 23.5   BUN mg/dL 18 23   CREATININE mg/dL 0.73 0.84   CALCIUM mg/dL 8.4* 8.6   GLUCOSE mg/dL 92 244*       Results from last 7 days  Lab Units 07/13/17  0329   INR  1.08     Hemoglobin A1C:  Lab Results   Component Value Date    HGBA1C 6.51 (H) " 07/13/2017       Glucose Range:  Glucose   Date/Time Value Ref Range Status   07/14/2017 0616 76 70 - 130 mg/dL Final   07/13/2017 2015 185 (H) 70 - 130 mg/dL Final   07/13/2017 1551 169 (H) 70 - 130 mg/dL Final   07/13/2017 1153 144 (H) 70 - 130 mg/dL Final   07/13/2017 0607 218 (H) 70 - 130 mg/dL Final   07/12/2017 2335 209 (H) 70 - 130 mg/dL Final       No results found for: [id number]    No results found for: TSH    Assessment & Plan      Medication Review: Yes    Active Hospital Problems (** Indicates Principal Problem)    Diagnosis Date Noted   • Difficulty walking [R26.2] 07/13/2017   • Fracture, ankle [S82.899A] 07/12/2017      Resolved Hospital Problems    Diagnosis Date Noted Date Resolved   No resolved problems to display.       Assessment/Plan  1.  Diarrhea.  Resolved.  C. difficile is still pending but with the absence of diarrhea, should be negative.  I did stop metformin yesterday.  2.  Diabetes most type II with hypoglycemia.  Off metformin.  Will decrease Levemir in the evening.  Will follow.  3.  Fever last night.  If she has another episode, I will order a chest x-ray and urine.  Continue incentive spirometry  4.  Hypokalemia.  I ordered one dose of oral potassium  5.  Hypertension.  Blood pressures noted.  Continue current medications.  Some increase likely secondary to pain  6.  Coronary artery disease, stable  7.  Expected acute blood loss anemia.  Recheck a.m.    Heidi Holguin MD  07/14/17  10:45 AM

## 2017-07-14 NOTE — PLAN OF CARE
Problem: Patient Care Overview (Adult)  Goal: Plan of Care Review  Outcome: Ongoing (interventions implemented as appropriate)    07/14/17 5913   Coping/Psychosocial Response Interventions   Plan Of Care Reviewed With patient   Patient Care Overview   Progress progress toward functional goals as expected   Outcome Evaluation   Outcome Summary/Follow up Plan PATIENT CONTINUES WITH NON WT BEARING STATUS. SPLINT LOOSENED SOME BY MD. TEMP ELEVATED OFF AND ON TODAY. MD NOTIFIED WITH NEW ORDERS FOR CXR AND UA. PT EDUCATED ABOUT IS AND BS CONTROL.       Goal: Adult Individualization and Mutuality  Outcome: Ongoing (interventions implemented as appropriate)  Goal: Discharge Needs Assessment  Outcome: Ongoing (interventions implemented as appropriate)    Problem: Fall Risk (Adult)  Goal: Absence of Falls  Outcome: Ongoing (interventions implemented as appropriate)    Problem: Pain, Acute (Adult)  Goal: Acceptable Pain Control/Comfort Level  Outcome: Ongoing (interventions implemented as appropriate)    Problem: Mobility, Physical Impaired (Adult)  Goal: Enhanced Mobility Skills  Outcome: Ongoing (interventions implemented as appropriate)  Goal: Enhanced Functionality Ability  Outcome: Ongoing (interventions implemented as appropriate)

## 2017-07-14 NOTE — PLAN OF CARE
Problem: Patient Care Overview (Adult)  Goal: Plan of Care Review  Outcome: Ongoing (interventions implemented as appropriate)    07/14/17 0244   Coping/Psychosocial Response Interventions   Plan Of Care Reviewed With patient   Patient Care Overview   Progress improving   Outcome Evaluation   Outcome Summary/Follow up Plan PO AND IV PAIN MEDICATION HELPING WITH PAIN. UP ASSIST X1 TO BSC. NWB TO RIGHT LEG. DUCATION PROVIDED ON THE IMPORTANCE OF BLOOD SUGAR MONITORING .       Goal: Adult Individualization and Mutuality  Outcome: Ongoing (interventions implemented as appropriate)  Goal: Discharge Needs Assessment  Outcome: Ongoing (interventions implemented as appropriate)    Problem: Fall Risk (Adult)  Goal: Absence of Falls  Outcome: Ongoing (interventions implemented as appropriate)    Problem: Pain, Acute (Adult)  Goal: Acceptable Pain Control/Comfort Level  Outcome: Ongoing (interventions implemented as appropriate)    Problem: Mobility, Physical Impaired (Adult)  Goal: Enhanced Mobility Skills  Outcome: Ongoing (interventions implemented as appropriate)  Goal: Enhanced Functionality Ability  Outcome: Ongoing (interventions implemented as appropriate)

## 2017-07-15 VITALS
HEIGHT: 63 IN | OXYGEN SATURATION: 96 % | BODY MASS INDEX: 27.82 KG/M2 | DIASTOLIC BLOOD PRESSURE: 63 MMHG | TEMPERATURE: 98.7 F | RESPIRATION RATE: 16 BRPM | HEART RATE: 96 BPM | WEIGHT: 157 LBS | SYSTOLIC BLOOD PRESSURE: 134 MMHG

## 2017-07-15 LAB
ANION GAP SERPL CALCULATED.3IONS-SCNC: 11.6 MMOL/L
BUN BLD-MCNC: 16 MG/DL (ref 8–23)
BUN/CREAT SERPL: 22.5 (ref 7–25)
CALCIUM SPEC-SCNC: 8.8 MG/DL (ref 8.6–10.5)
CHLORIDE SERPL-SCNC: 98 MMOL/L (ref 98–107)
CO2 SERPL-SCNC: 26.4 MMOL/L (ref 22–29)
CREAT BLD-MCNC: 0.71 MG/DL (ref 0.57–1)
DEPRECATED RDW RBC AUTO: 53.3 FL (ref 37–54)
ERYTHROCYTE [DISTWIDTH] IN BLOOD BY AUTOMATED COUNT: 17.6 % (ref 11.7–13)
GFR SERPL CREATININE-BSD FRML MDRD: 80 ML/MIN/1.73
GLUCOSE BLD-MCNC: 229 MG/DL (ref 65–99)
GLUCOSE BLDC GLUCOMTR-MCNC: 217 MG/DL (ref 70–130)
GLUCOSE BLDC GLUCOMTR-MCNC: 230 MG/DL (ref 70–130)
GLUCOSE BLDC GLUCOMTR-MCNC: 342 MG/DL (ref 70–130)
HCT VFR BLD AUTO: 29.4 % (ref 35.6–45.5)
HGB BLD-MCNC: 9.1 G/DL (ref 11.9–15.5)
INR PPP: 1.17 (ref 0.9–1.1)
MCH RBC QN AUTO: 25.3 PG (ref 26.9–32)
MCHC RBC AUTO-ENTMCNC: 31 G/DL (ref 32.4–36.3)
MCV RBC AUTO: 81.9 FL (ref 80.5–98.2)
PLATELET # BLD AUTO: 211 10*3/MM3 (ref 140–500)
PMV BLD AUTO: 9.2 FL (ref 6–12)
POTASSIUM BLD-SCNC: 3.9 MMOL/L (ref 3.5–5.2)
PROTHROMBIN TIME: 14.5 SECONDS (ref 11.7–14.2)
RBC # BLD AUTO: 3.59 10*6/MM3 (ref 3.9–5.2)
SODIUM BLD-SCNC: 136 MMOL/L (ref 136–145)
WBC NRBC COR # BLD: 9.65 10*3/MM3 (ref 4.5–10.7)

## 2017-07-15 PROCEDURE — 63710000001 INSULIN DETEMER PER 5 UNITS: Performed by: INTERNAL MEDICINE

## 2017-07-15 PROCEDURE — 85610 PROTHROMBIN TIME: CPT | Performed by: INTERNAL MEDICINE

## 2017-07-15 PROCEDURE — 82962 GLUCOSE BLOOD TEST: CPT

## 2017-07-15 PROCEDURE — 80048 BASIC METABOLIC PNL TOTAL CA: CPT | Performed by: INTERNAL MEDICINE

## 2017-07-15 PROCEDURE — G0378 HOSPITAL OBSERVATION PER HR: HCPCS

## 2017-07-15 PROCEDURE — 85027 COMPLETE CBC AUTOMATED: CPT | Performed by: INTERNAL MEDICINE

## 2017-07-15 PROCEDURE — 94799 UNLISTED PULMONARY SVC/PX: CPT

## 2017-07-15 RX ORDER — DOXYCYCLINE 100 MG/1
100 CAPSULE ORAL EVERY 12 HOURS SCHEDULED
Qty: 14 CAPSULE | Refills: 0 | Status: SHIPPED | OUTPATIENT
Start: 2017-07-15 | End: 2017-09-06

## 2017-07-15 RX ADMIN — ATORVASTATIN CALCIUM 40 MG: 20 TABLET, FILM COATED ORAL at 09:18

## 2017-07-15 RX ADMIN — DORZOLAMIDE HYDROCHLORIDE AND TIMOLOL MALEATE 1 DROP: 20; 5 SOLUTION/ DROPS OPHTHALMIC at 09:17

## 2017-07-15 RX ADMIN — CARVEDILOL 3.12 MG: 3.12 TABLET, FILM COATED ORAL at 09:18

## 2017-07-15 RX ADMIN — POTASSIUM CHLORIDE 10 MEQ: 750 CAPSULE, EXTENDED RELEASE ORAL at 09:18

## 2017-07-15 RX ADMIN — INSULIN DETEMIR 35 UNITS: 100 INJECTION, SOLUTION SUBCUTANEOUS at 17:52

## 2017-07-15 RX ADMIN — WARFARIN SODIUM 2 MG: 2 TABLET ORAL at 17:52

## 2017-07-15 RX ADMIN — PANTOPRAZOLE SODIUM 40 MG: 40 TABLET, DELAYED RELEASE ORAL at 06:26

## 2017-07-15 RX ADMIN — METFORMIN HYDROCHLORIDE 500 MG: 500 TABLET ORAL at 14:36

## 2017-07-15 RX ADMIN — LISINOPRIL 40 MG: 40 TABLET ORAL at 09:18

## 2017-07-15 RX ADMIN — HYDROCODONE BITARTRATE AND ACETAMINOPHEN 1 TABLET: 10; 325 TABLET ORAL at 14:36

## 2017-07-15 RX ADMIN — FUROSEMIDE 40 MG: 40 TABLET ORAL at 09:18

## 2017-07-15 RX ADMIN — DORZOLAMIDE HYDROCHLORIDE AND TIMOLOL MALEATE 1 DROP: 20; 5 SOLUTION/ DROPS OPHTHALMIC at 17:52

## 2017-07-15 RX ADMIN — DOXYCYCLINE 100 MG: 100 CAPSULE ORAL at 09:18

## 2017-07-15 RX ADMIN — AMLODIPINE BESYLATE 10 MG: 10 TABLET ORAL at 09:18

## 2017-07-15 RX ADMIN — OXYCODONE HYDROCHLORIDE AND ACETAMINOPHEN 1 TABLET: 7.5; 325 TABLET ORAL at 09:18

## 2017-07-15 RX ADMIN — CARVEDILOL 3.12 MG: 3.12 TABLET, FILM COATED ORAL at 17:52

## 2017-07-15 RX ADMIN — OXYCODONE HYDROCHLORIDE AND ACETAMINOPHEN 1 TABLET: 7.5; 325 TABLET ORAL at 03:39

## 2017-07-15 NOTE — DISCHARGE SUMMARY
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  female who underwent successful repair of her right ankle on 7/12/2017. She was started on Coumadin 2mg per day (usual home dose) immediately post-operatively for DVT prophylaxis.  On post-op day 2 the patients dressing was changed and splint loosened, and the incision was clean, with no signs of infection and her calf was soft, with no signs of DVT.  The patient progressed well with physical therapy and the patients hemoglobin remained stable. On post-operative day 3 the patient was felt ready for discharge. She had been started on doxycycline for UTI prior to D/C and this will be continued.     She will remain in the splint NWB and follow up later this week.      Discharge Medications:     1) Percocet 7.5  1-2 po q 4-6 hours for pain control  2)  Doxycycline 100mg po BID for 10 days    CC:Cedric Ramirez MD:Sriram De Luna MD

## 2017-07-15 NOTE — DISCHARGE INSTR - ACTIVITY
Non weight bearing on right leg.   Continue to ice and elevate the leg.   Take pain meds on a regular basis.   Call and make a follow up appointment later this week with Dr. De Luna.

## 2017-07-15 NOTE — PROGRESS NOTES
"     LOS: 1 day   Primary Care Physician: Cedric Ramirez MD     Subjective   Feels a little better today.  Ankle pain is less.  Doesn't yet feel able to go home    Vital Signs  Body mass index is 27.81 kg/(m^2).  Temp:  [97.2 °F (36.2 °C)-100.7 °F (38.2 °C)] 97.2 °F (36.2 °C)  Heart Rate:  [67-91] 72  Resp:  [16-18] 18  BP: (116-148)/(51-69) 148/62      Objective:  General Appearance:  In no acute distress.    Vital signs: (most recent): Blood pressure 148/62, pulse 72, temperature 97.2 °F (36.2 °C), temperature source Oral, resp. rate 18, height 63\" (160 cm), weight 157 lb (71.2 kg), SpO2 100 %.    Lungs:  There are decreased breath sounds.  No wheezes, rales or rhonchi.    Heart: Normal rate.  Regular rhythm.  Positive for murmur.  (Grade 2 to 3/6 systolic murmur right and left upper sternal borders)  Abdomen: Abdomen is soft and non-distended.  Bowel sounds are normal.   There is no abdominal tenderness.   There is no splenomegaly. There is no hepatomegaly.   Extremities: There is dependent edema.  (Trace at left ankle.  Right leg splinted/bandaged)  Neurological: Patient is alert.          Results Review:    I reviewed the patient's new clinical results.     Chest x-ray negative  Urinalysis with leukocytes and pyuria      Results from last 7 days  Lab Units 07/15/17  0438 07/14/17 0429   WBC 10*3/mm3 9.65 9.91   HEMOGLOBIN g/dL 9.1* 8.9*   PLATELETS 10*3/mm3 211 208       Results from last 7 days  Lab Units 07/15/17  0438 07/14/17  0429   SODIUM mmol/L 136 133*   POTASSIUM mmol/L 3.9 3.3*   CHLORIDE mmol/L 98 96*   CO2 mmol/L 26.4 24.3   BUN mg/dL 16 18   CREATININE mg/dL 0.71 0.73   CALCIUM mg/dL 8.8 8.4*   GLUCOSE mg/dL 229* 92       Results from last 7 days  Lab Units 07/15/17  0438   INR  1.17*     Hemoglobin A1C:  Lab Results   Component Value Date    HGBA1C 6.51 (H) 07/13/2017       Glucose Range:  Glucose   Date/Time Value Ref Range Status   07/15/2017 1049 342 (H) 70 - 130 mg/dL Final "   07/15/2017 0636 230 (H) 70 - 130 mg/dL Final   07/14/2017 2114 282 (H) 70 - 130 mg/dL Final   07/14/2017 1549 207 (H) 70 - 130 mg/dL Final   07/14/2017 1129 108 70 - 130 mg/dL Final   07/14/2017 0616 76 70 - 130 mg/dL Final       No results found for: [id number]    No results found for: TSH    Assessment & Plan      Medication Review: Yes    Active Hospital Problems (** Indicates Principal Problem)    Diagnosis Date Noted   • Difficulty walking [R26.2] 07/13/2017   • Fracture, ankle [S82.899A] 07/12/2017      Resolved Hospital Problems    Diagnosis Date Noted Date Resolved   No resolved problems to display.       Assessment/Plan  1.  Probable urinary tract infection.  I started doxycycline yesterday.  Last fever was just before midnight.  Culture pending.  White blood cell count is normal  2.  Diabetes mellitus type 2 with hyperglycemia.  Resume Levemir at home dose of 35 units twice daily; continue sliding scale  3.  Hypertension.  Off lisinopril.  Blood pressures okay.  Still on Norvasc.    Heidi Holguin MD  07/15/17  12:18 PM

## 2017-07-15 NOTE — PLAN OF CARE
Problem: Patient Care Overview (Adult)  Goal: Plan of Care Review  Outcome: Outcome(s) achieved Date Met:  07/15/17    07/15/17 1900   Coping/Psychosocial Response Interventions   Plan Of Care Reviewed With patient   Patient Care Overview   Progress improving   Outcome Evaluation   Outcome Summary/Follow up Plan PO pain meds controlling pain. afebrile this shift. patient instructed on monitoring glucose at home. NWB. given discharge paperwork.        Goal: Adult Individualization and Mutuality  Outcome: Outcome(s) achieved Date Met:  07/15/17  Goal: Discharge Needs Assessment  Outcome: Outcome(s) achieved Date Met:  07/15/17    Problem: Fall Risk (Adult)  Goal: Absence of Falls  Outcome: Outcome(s) achieved Date Met:  07/15/17    Problem: Pain, Acute (Adult)  Goal: Acceptable Pain Control/Comfort Level  Outcome: Outcome(s) achieved Date Met:  07/15/17    Problem: Mobility, Physical Impaired (Adult)  Goal: Enhanced Mobility Skills  Outcome: Outcome(s) achieved Date Met:  07/15/17  Goal: Enhanced Functionality Ability  Outcome: Outcome(s) achieved Date Met:  07/15/17

## 2017-07-15 NOTE — PLAN OF CARE
Problem: Patient Care Overview (Adult)  Goal: Plan of Care Review  Outcome: Ongoing (interventions implemented as appropriate)    07/15/17 0409 07/15/17 0413   Coping/Psychosocial Response Interventions   Plan Of Care Reviewed With --  patient   Patient Care Overview   Progress --  improving   Outcome Evaluation   Outcome Summary/Follow up Plan po pain medication effective, NWB, temp 100.7, u/a and chest -x-ray done, oral antibiotics started, educated on self management of hypertension and monitoring of blood pressure at home --        Goal: Adult Individualization and Mutuality  Outcome: Ongoing (interventions implemented as appropriate)  Goal: Discharge Needs Assessment  Outcome: Ongoing (interventions implemented as appropriate)    Problem: Fall Risk (Adult)  Goal: Absence of Falls  Outcome: Ongoing (interventions implemented as appropriate)    Problem: Pain, Acute (Adult)  Goal: Acceptable Pain Control/Comfort Level  Outcome: Ongoing (interventions implemented as appropriate)

## 2017-07-15 NOTE — PLAN OF CARE
Problem: Patient Care Overview (Adult)  Goal: Plan of Care Review    07/15/17 0409   Outcome Evaluation   Outcome Summary/Follow up Plan po pain medication effective, NWB, temp 100.7, u/a and chest -x-ray done, oral antibiotics started, educated on self management of hypertension and monitoring of blood pressure at home       Goal: Adult Individualization and Mutuality  Outcome: Ongoing (interventions implemented as appropriate)  Goal: Discharge Needs Assessment  Outcome: Ongoing (interventions implemented as appropriate)    Problem: Fall Risk (Adult)  Goal: Absence of Falls  Outcome: Ongoing (interventions implemented as appropriate)    Problem: Pain, Acute (Adult)  Goal: Acceptable Pain Control/Comfort Level  Outcome: Ongoing (interventions implemented as appropriate)

## 2017-07-16 LAB — BACTERIA SPEC AEROBE CULT: NORMAL

## 2017-09-06 ENCOUNTER — APPOINTMENT (OUTPATIENT)
Dept: PREADMISSION TESTING | Facility: HOSPITAL | Age: 77
End: 2017-09-06

## 2017-09-06 VITALS
RESPIRATION RATE: 20 BRPM | DIASTOLIC BLOOD PRESSURE: 71 MMHG | HEART RATE: 77 BPM | WEIGHT: 158 LBS | OXYGEN SATURATION: 98 % | SYSTOLIC BLOOD PRESSURE: 155 MMHG | TEMPERATURE: 96.9 F | BODY MASS INDEX: 28 KG/M2 | HEIGHT: 63 IN

## 2017-09-06 LAB
ANION GAP SERPL CALCULATED.3IONS-SCNC: 11.8 MMOL/L
BUN BLD-MCNC: 18 MG/DL (ref 8–23)
BUN/CREAT SERPL: 29.5 (ref 7–25)
CALCIUM SPEC-SCNC: 9.7 MG/DL (ref 8.6–10.5)
CHLORIDE SERPL-SCNC: 101 MMOL/L (ref 98–107)
CO2 SERPL-SCNC: 27.2 MMOL/L (ref 22–29)
CREAT BLD-MCNC: 0.61 MG/DL (ref 0.57–1)
DEPRECATED RDW RBC AUTO: 50.4 FL (ref 37–54)
ERYTHROCYTE [DISTWIDTH] IN BLOOD BY AUTOMATED COUNT: 16.1 % (ref 11.7–13)
GFR SERPL CREATININE-BSD FRML MDRD: 95 ML/MIN/1.73
GLUCOSE BLD-MCNC: 54 MG/DL (ref 65–99)
HCT VFR BLD AUTO: 34.8 % (ref 35.6–45.5)
HGB BLD-MCNC: 10.7 G/DL (ref 11.9–15.5)
INR PPP: 1.07 (ref 0.9–1.1)
MCH RBC QN AUTO: 26.2 PG (ref 26.9–32)
MCHC RBC AUTO-ENTMCNC: 30.7 G/DL (ref 32.4–36.3)
MCV RBC AUTO: 85.3 FL (ref 80.5–98.2)
PLATELET # BLD AUTO: 226 10*3/MM3 (ref 140–500)
PMV BLD AUTO: 9.6 FL (ref 6–12)
POTASSIUM BLD-SCNC: 3.9 MMOL/L (ref 3.5–5.2)
PROTHROMBIN TIME: 13.5 SECONDS (ref 11.7–14.2)
RBC # BLD AUTO: 4.08 10*6/MM3 (ref 3.9–5.2)
SODIUM BLD-SCNC: 140 MMOL/L (ref 136–145)
WBC NRBC COR # BLD: 8.14 10*3/MM3 (ref 4.5–10.7)

## 2017-09-06 PROCEDURE — 36415 COLL VENOUS BLD VENIPUNCTURE: CPT

## 2017-09-06 PROCEDURE — 85027 COMPLETE CBC AUTOMATED: CPT | Performed by: ORTHOPAEDIC SURGERY

## 2017-09-06 PROCEDURE — 85610 PROTHROMBIN TIME: CPT | Performed by: ORTHOPAEDIC SURGERY

## 2017-09-06 PROCEDURE — 80048 BASIC METABOLIC PNL TOTAL CA: CPT | Performed by: ORTHOPAEDIC SURGERY

## 2017-09-06 RX ORDER — OXYCODONE AND ACETAMINOPHEN 7.5; 325 MG/1; MG/1
1 TABLET ORAL EVERY 4 HOURS PRN
COMMUNITY

## 2017-09-06 RX ORDER — LISINOPRIL 40 MG/1
40 TABLET ORAL DAILY
COMMUNITY

## 2017-09-06 RX ORDER — ASCORBIC ACID 500 MG
500 TABLET ORAL DAILY
COMMUNITY

## 2017-09-06 RX ORDER — VENLAFAXINE 75 MG/1
75 TABLET ORAL NIGHTLY
COMMUNITY

## 2017-09-06 NOTE — DISCHARGE INSTRUCTIONS
Take the following medications the morning of surgery with a small sip of water: AMLODIPINE, CARVEDILOL, OMEPRAZOLE.  STOP PREOPERATIVELY AS OF TODAY 9/6/17:  YOUR COUMADIN (ALREADY DONE), AND VITAMIN C/ANY ANTIINFLAMMATORY.  ARRIVE TO THE OUTPATIENT SURGERY DESK THE DAY OF YOUR SURGERY BY 6AM.  CALL YOUR FAMILY DOCTOR FOR INSULIN INSTRUCTIONS BEFORE YOUR SURGERY!        General Instructions:  • Do not eat solid food after midnight the night before surgery.  • You may drink clear liquids day of surgery but must stop at least one hour before your hospital arrival time.  • It is beneficial for you to have a clear drink that contains carbohydrates the day of surgery.  We suggest a 20 ounce bottle of Gatorade or Powerade for non-diabetic patients or a 20 ounce bottle of G2 or Powerade Zero for diabetic patients. (Pediatric patients, are not advised to drink a 20 ounce carbohydrate drink)    Clear liquids are liquids you can see through.  Nothing red in color.     Plain water                               Sports drinks  Sodas                                   Gelatin (Jell-O)  Fruit juices without pulp such as white grape juice and apple juice  Popsicles that contain no fruit or yogurt  Tea or coffee (no cream or milk added)  Gatorade / Powerade  G2 / Powerade Zero    • Infants may have breast milk up to four hours before surgery.  • Infants drinking formula may drink formula up to six hours before surgery.   • Patients who avoid smoking, chewing tobacco and alcohol for 4 weeks prior to surgery have a reduced risk of post-operative complications.  Quit smoking as many days before surgery as you can.  • Do not smoke, use chewing tobacco or drink alcohol the day of surgery.   • If applicable bring your C-PAP/ BI-PAP machine.  • Bring any papers given to you in the doctor’s office.  • Wear clean comfortable clothes and socks.  • Do not wear contact lenses or make-up.  Bring a case for your glasses.   • Bring crutches or  walker if applicable.  • Leave all other valuables and jewelry at home.  • The Pre-Admission Testing nurse will instruct you to bring medications if unable to obtain an accurate list in Pre-Admission Testing.        If you were given a blood bank ID arm band remember to bring it with you the day of surgery.    Preventing a Surgical Site Infection:  • For 2 to 3 days before surgery, avoid shaving with a razor because the razor can irritate skin and make it easier to develop an infection.  • The night prior to surgery sleep in a clean bed with clean clothing.  Do not allow pets to sleep with you.  • Shower on the morning of surgery using a fresh bar of anti-bacterial soap (such as Dial) and clean washcloth.  Dry with a clean towel and dress in clean clothing.  • Ask your surgeon if you will be receiving antibiotics prior to surgery.  • Make sure you, your family, and all healthcare providers clean their hands with soap and water or an alcohol based hand  before caring for you or your wound.    Day of surgery:  Upon arrival, a Pre-op nurse and Anesthesiologist will review your health history, obtain vital signs, and answer questions you may have.  The only belongings needed at this time will be your home medications and if applicable your C-PAP/BI-PAP machine.  If you are staying overnight your family can leave the rest of your belongings in the car and bring them to your room later.  A Pre-op nurse will start an IV and you may receive medication in preparation for surgery, including something to help you relax.  Your family will be able to see you in the Pre-op area.  While you are in surgery your family should notify the waiting room  if they leave the waiting room area and provide a contact phone number.    Please be aware that surgery does come with discomfort.  We want to make every effort to control your discomfort so please discuss any uncontrolled symptoms with your nurse.   Your doctor  will most likely have prescribed pain medications.      If you are going home after surgery you will receive individualized written care instructions before being discharged.  A responsible adult must drive you to and from the hospital on the day of your surgery and stay with you for 24 hours.    If you are staying overnight following surgery, you will be transported to your hospital room following the recovery period.  [facility] has all private rooms.    If you have any questions please call Pre-Admission Testing at [phone].  Deductibles and co-payments are collected on the day of service. Please be prepared to pay the required co-pay, deductible or deposit on the day of service as defined by your plan.

## 2017-09-08 ENCOUNTER — ANESTHESIA EVENT (OUTPATIENT)
Dept: PERIOP | Facility: HOSPITAL | Age: 77
End: 2017-09-08

## 2017-09-08 ENCOUNTER — ANESTHESIA (OUTPATIENT)
Dept: PERIOP | Facility: HOSPITAL | Age: 77
End: 2017-09-08

## 2017-09-08 ENCOUNTER — APPOINTMENT (OUTPATIENT)
Dept: GENERAL RADIOLOGY | Facility: HOSPITAL | Age: 77
End: 2017-09-08

## 2017-09-08 ENCOUNTER — HOSPITAL ENCOUNTER (OUTPATIENT)
Facility: HOSPITAL | Age: 77
Setting detail: HOSPITAL OUTPATIENT SURGERY
Discharge: HOME | End: 2017-09-08
Attending: ORTHOPAEDIC SURGERY | Admitting: ORTHOPAEDIC SURGERY

## 2017-09-08 VITALS
WEIGHT: 155 LBS | BODY MASS INDEX: 27.46 KG/M2 | OXYGEN SATURATION: 97 % | TEMPERATURE: 97.4 F | SYSTOLIC BLOOD PRESSURE: 107 MMHG | RESPIRATION RATE: 18 BRPM | DIASTOLIC BLOOD PRESSURE: 65 MMHG | HEART RATE: 78 BPM

## 2017-09-08 LAB — GLUCOSE BLDC GLUCOMTR-MCNC: 149 MG/DL (ref 70–130)

## 2017-09-08 PROCEDURE — 25010000002 MIDAZOLAM PER 1 MG: Performed by: ANESTHESIOLOGY

## 2017-09-08 PROCEDURE — C1713 ANCHOR/SCREW BN/BN,TIS/BN: HCPCS | Performed by: ORTHOPAEDIC SURGERY

## 2017-09-08 PROCEDURE — 25010000002 FENTANYL CITRATE (PF) 100 MCG/2ML SOLUTION: Performed by: ANESTHESIOLOGY

## 2017-09-08 PROCEDURE — 82962 GLUCOSE BLOOD TEST: CPT

## 2017-09-08 PROCEDURE — 76000 FLUOROSCOPY <1 HR PHYS/QHP: CPT

## 2017-09-08 PROCEDURE — 73610 X-RAY EXAM OF ANKLE: CPT

## 2017-09-08 PROCEDURE — 25010000002 PROPOFOL 10 MG/ML EMULSION: Performed by: NURSE ANESTHETIST, CERTIFIED REGISTERED

## 2017-09-08 PROCEDURE — 25010000003 CEFAZOLIN IN D5W 1 GM/50ML SOLUTION: Performed by: ORTHOPAEDIC SURGERY

## 2017-09-08 PROCEDURE — 25010000002 HYDROMORPHONE PER 4 MG: Performed by: ANESTHESIOLOGY

## 2017-09-08 DEVICE — IMPLANTABLE DEVICE: Type: IMPLANTABLE DEVICE | Site: ANKLE | Status: FUNCTIONAL

## 2017-09-08 RX ORDER — MIDAZOLAM HYDROCHLORIDE 1 MG/ML
2 INJECTION INTRAMUSCULAR; INTRAVENOUS
Status: DISCONTINUED | OUTPATIENT
Start: 2017-09-08 | End: 2017-09-08 | Stop reason: HOSPADM

## 2017-09-08 RX ORDER — EPHEDRINE SULFATE 50 MG/ML
INJECTION, SOLUTION INTRAVENOUS AS NEEDED
Status: DISCONTINUED | OUTPATIENT
Start: 2017-09-08 | End: 2017-09-08 | Stop reason: SURG

## 2017-09-08 RX ORDER — FAMOTIDINE 10 MG/ML
20 INJECTION, SOLUTION INTRAVENOUS ONCE
Status: COMPLETED | OUTPATIENT
Start: 2017-09-08 | End: 2017-09-08

## 2017-09-08 RX ORDER — LIDOCAINE HYDROCHLORIDE 20 MG/ML
INJECTION, SOLUTION INFILTRATION; PERINEURAL AS NEEDED
Status: DISCONTINUED | OUTPATIENT
Start: 2017-09-08 | End: 2017-09-08 | Stop reason: SURG

## 2017-09-08 RX ORDER — PROMETHAZINE HYDROCHLORIDE 25 MG/1
25 SUPPOSITORY RECTAL ONCE AS NEEDED
Status: DISCONTINUED | OUTPATIENT
Start: 2017-09-08 | End: 2017-09-08 | Stop reason: HOSPADM

## 2017-09-08 RX ORDER — SODIUM CHLORIDE, SODIUM LACTATE, POTASSIUM CHLORIDE, CALCIUM CHLORIDE 600; 310; 30; 20 MG/100ML; MG/100ML; MG/100ML; MG/100ML
9 INJECTION, SOLUTION INTRAVENOUS CONTINUOUS
Status: DISCONTINUED | OUTPATIENT
Start: 2017-09-08 | End: 2017-09-08 | Stop reason: HOSPADM

## 2017-09-08 RX ORDER — HYDRALAZINE HYDROCHLORIDE 20 MG/ML
5 INJECTION INTRAMUSCULAR; INTRAVENOUS
Status: DISCONTINUED | OUTPATIENT
Start: 2017-09-08 | End: 2017-09-08 | Stop reason: HOSPADM

## 2017-09-08 RX ORDER — MIDAZOLAM HYDROCHLORIDE 1 MG/ML
1 INJECTION INTRAMUSCULAR; INTRAVENOUS
Status: DISCONTINUED | OUTPATIENT
Start: 2017-09-08 | End: 2017-09-08 | Stop reason: HOSPADM

## 2017-09-08 RX ORDER — PROMETHAZINE HYDROCHLORIDE 25 MG/ML
12.5 INJECTION, SOLUTION INTRAMUSCULAR; INTRAVENOUS ONCE AS NEEDED
Status: DISCONTINUED | OUTPATIENT
Start: 2017-09-08 | End: 2017-09-08 | Stop reason: HOSPADM

## 2017-09-08 RX ORDER — DIPHENHYDRAMINE HYDROCHLORIDE 50 MG/ML
12.5 INJECTION INTRAMUSCULAR; INTRAVENOUS
Status: DISCONTINUED | OUTPATIENT
Start: 2017-09-08 | End: 2017-09-08 | Stop reason: HOSPADM

## 2017-09-08 RX ORDER — PROMETHAZINE HYDROCHLORIDE 25 MG/1
25 TABLET ORAL ONCE AS NEEDED
Status: DISCONTINUED | OUTPATIENT
Start: 2017-09-08 | End: 2017-09-08 | Stop reason: HOSPADM

## 2017-09-08 RX ORDER — SODIUM CHLORIDE 0.9 % (FLUSH) 0.9 %
1-10 SYRINGE (ML) INJECTION AS NEEDED
Status: DISCONTINUED | OUTPATIENT
Start: 2017-09-08 | End: 2017-09-08 | Stop reason: HOSPADM

## 2017-09-08 RX ORDER — PROPOFOL 10 MG/ML
VIAL (ML) INTRAVENOUS AS NEEDED
Status: DISCONTINUED | OUTPATIENT
Start: 2017-09-08 | End: 2017-09-08 | Stop reason: SURG

## 2017-09-08 RX ORDER — NALOXONE HCL 0.4 MG/ML
0.2 VIAL (ML) INJECTION AS NEEDED
Status: DISCONTINUED | OUTPATIENT
Start: 2017-09-08 | End: 2017-09-08 | Stop reason: HOSPADM

## 2017-09-08 RX ORDER — FLUMAZENIL 0.1 MG/ML
0.2 INJECTION INTRAVENOUS AS NEEDED
Status: DISCONTINUED | OUTPATIENT
Start: 2017-09-08 | End: 2017-09-08 | Stop reason: HOSPADM

## 2017-09-08 RX ORDER — FENTANYL CITRATE 50 UG/ML
50 INJECTION, SOLUTION INTRAMUSCULAR; INTRAVENOUS
Status: DISCONTINUED | OUTPATIENT
Start: 2017-09-08 | End: 2017-09-08 | Stop reason: HOSPADM

## 2017-09-08 RX ORDER — HYDROMORPHONE HYDROCHLORIDE 1 MG/ML
0.5 INJECTION, SOLUTION INTRAMUSCULAR; INTRAVENOUS; SUBCUTANEOUS
Status: DISCONTINUED | OUTPATIENT
Start: 2017-09-08 | End: 2017-09-08 | Stop reason: HOSPADM

## 2017-09-08 RX ORDER — PROMETHAZINE HYDROCHLORIDE 25 MG/1
12.5 TABLET ORAL ONCE AS NEEDED
Status: DISCONTINUED | OUTPATIENT
Start: 2017-09-08 | End: 2017-09-08 | Stop reason: HOSPADM

## 2017-09-08 RX ORDER — HYDROCODONE BITARTRATE AND ACETAMINOPHEN 7.5; 325 MG/1; MG/1
1 TABLET ORAL ONCE AS NEEDED
Status: DISCONTINUED | OUTPATIENT
Start: 2017-09-08 | End: 2017-09-08 | Stop reason: HOSPADM

## 2017-09-08 RX ORDER — ONDANSETRON 2 MG/ML
4 INJECTION INTRAMUSCULAR; INTRAVENOUS ONCE AS NEEDED
Status: DISCONTINUED | OUTPATIENT
Start: 2017-09-08 | End: 2017-09-08 | Stop reason: HOSPADM

## 2017-09-08 RX ORDER — EPHEDRINE SULFATE 50 MG/ML
5 INJECTION, SOLUTION INTRAVENOUS ONCE AS NEEDED
Status: DISCONTINUED | OUTPATIENT
Start: 2017-09-08 | End: 2017-09-08 | Stop reason: HOSPADM

## 2017-09-08 RX ORDER — OXYCODONE AND ACETAMINOPHEN 7.5; 325 MG/1; MG/1
1 TABLET ORAL ONCE AS NEEDED
Status: COMPLETED | OUTPATIENT
Start: 2017-09-08 | End: 2017-09-08

## 2017-09-08 RX ORDER — BUPIVACAINE HYDROCHLORIDE AND EPINEPHRINE 5; 5 MG/ML; UG/ML
INJECTION, SOLUTION PERINEURAL AS NEEDED
Status: DISCONTINUED | OUTPATIENT
Start: 2017-09-08 | End: 2017-09-08 | Stop reason: HOSPADM

## 2017-09-08 RX ORDER — LABETALOL HYDROCHLORIDE 5 MG/ML
5 INJECTION, SOLUTION INTRAVENOUS
Status: DISCONTINUED | OUTPATIENT
Start: 2017-09-08 | End: 2017-09-08 | Stop reason: HOSPADM

## 2017-09-08 RX ORDER — OXYCODONE AND ACETAMINOPHEN 7.5; 325 MG/1; MG/1
1-2 TABLET ORAL EVERY 4 HOURS PRN
Qty: 80 TABLET | Refills: 0 | Status: SHIPPED | OUTPATIENT
Start: 2017-09-08

## 2017-09-08 RX ADMIN — CEFAZOLIN SODIUM 1 G: 1 INJECTION, SOLUTION INTRAVENOUS at 10:08

## 2017-09-08 RX ADMIN — FAMOTIDINE 20 MG: 10 INJECTION, SOLUTION INTRAVENOUS at 07:44

## 2017-09-08 RX ADMIN — HYDROMORPHONE HYDROCHLORIDE 0.5 MG: 1 INJECTION, SOLUTION INTRAMUSCULAR; INTRAVENOUS; SUBCUTANEOUS at 12:18

## 2017-09-08 RX ADMIN — OXYCODONE HYDROCHLORIDE AND ACETAMINOPHEN 1 TABLET: 7.5; 325 TABLET ORAL at 11:50

## 2017-09-08 RX ADMIN — FENTANYL CITRATE 25 MCG: 50 INJECTION INTRAMUSCULAR; INTRAVENOUS at 10:44

## 2017-09-08 RX ADMIN — FENTANYL CITRATE 50 MCG: 50 INJECTION INTRAMUSCULAR; INTRAVENOUS at 11:52

## 2017-09-08 RX ADMIN — MIDAZOLAM 1 MG: 1 INJECTION INTRAMUSCULAR; INTRAVENOUS at 07:43

## 2017-09-08 RX ADMIN — SODIUM CHLORIDE, POTASSIUM CHLORIDE, SODIUM LACTATE AND CALCIUM CHLORIDE 9 ML/HR: 600; 310; 30; 20 INJECTION, SOLUTION INTRAVENOUS at 07:35

## 2017-09-08 RX ADMIN — FENTANYL CITRATE 25 MCG: 50 INJECTION INTRAMUSCULAR; INTRAVENOUS at 10:14

## 2017-09-08 RX ADMIN — EPHEDRINE SULFATE 15 MG: 50 INJECTION INTRAMUSCULAR; INTRAVENOUS; SUBCUTANEOUS at 10:18

## 2017-09-08 RX ADMIN — LIDOCAINE HYDROCHLORIDE 80 MG: 20 INJECTION, SOLUTION INFILTRATION; PERINEURAL at 09:55

## 2017-09-08 RX ADMIN — SODIUM CHLORIDE, POTASSIUM CHLORIDE, SODIUM LACTATE AND CALCIUM CHLORIDE 9 ML/HR: 600; 310; 30; 20 INJECTION, SOLUTION INTRAVENOUS at 13:01

## 2017-09-08 RX ADMIN — PROPOFOL 120 MG: 10 INJECTION, EMULSION INTRAVENOUS at 09:55

## 2017-09-08 RX ADMIN — EPHEDRINE SULFATE 15 MG: 50 INJECTION INTRAMUSCULAR; INTRAVENOUS; SUBCUTANEOUS at 10:16

## 2017-09-08 RX ADMIN — FENTANYL CITRATE 50 MCG: 50 INJECTION INTRAMUSCULAR; INTRAVENOUS at 11:45

## 2017-09-08 NOTE — ANESTHESIA PREPROCEDURE EVALUATION
Anesthesia Evaluation     Patient summary reviewed and Nursing notes reviewed   NPO Solid Status: > 8 hours  NPO Liquid Status: > 2 hours     Airway   Mallampati: II  TM distance: >3 FB  Neck ROM: full  Dental - normal exam     Pulmonary - normal exam   (+) a smoker Former, COPD, asthma,   Cardiovascular - normal exam    (+) hypertension, CAD, CABG, dysrhythmias Atrial Fib, hyperlipidemia      Neuro/Psych  GI/Hepatic/Renal/Endo    (+)  GERD, diabetes mellitus, hypothyroidism,     Musculoskeletal     Abdominal    Substance History      OB/GYN          Other   (+) arthritis                                     Anesthesia Plan    ASA 3     general     Anesthetic plan and risks discussed with patient.

## 2017-09-08 NOTE — PLAN OF CARE
Problem: Patient Care Overview (Adult)  Goal: Plan of Care Review  Outcome: Ongoing (interventions implemented as appropriate)    09/08/17 0657   Coping/Psychosocial Response Interventions   Plan Of Care Reviewed With patient   Patient Care Overview   Progress improving

## 2017-09-08 NOTE — H&P
ORTHOPAEDIC HISTORY AND PHYSICAL      Patient: Cele Owens  YOB: 1940    Date of Admission: 9/8/2017   Medical Record Number: [id number]    Attending Physician: Sriram De Luna MD    Consulting Physician: Sriram De Luna MD    Chief Complaint: right ankle pain    History of Present Illness: 77 y.o. female presented to the office with prominent hardware in right ankle. S/p repair non-union complex ankle fx.    Patient denies any history of: DVT/PE, MRSA, COPD, SUE, CHF, CAD, or A-Fib. Patient is not taking anticoagulants. No Coumadin x 3 days.    Past Medical History:   Diagnosis Date   • Ankle pain, right    • Arthritis    • Asthma     NO MEDS OR INHALERS PER PT   • Atrial fibrillation    • COPD (chronic obstructive pulmonary disease)     NO INHALERS   • Coronary artery disease    • Diabetes mellitus     TYPE 2   • Frequent UTI    • GERD (gastroesophageal reflux disease)    • Glaucoma    • High cholesterol    • History of MI (myocardial infarction) 2011   • Hypertension    • Hypothyroidism    • Knee pain, bilateral    • Macular degeneration    • On anticoagulant therapy    • Osteomyelitis 02/2017    ENTEROCOCCUS FAECALIS   • Uses hearing aid     BILAT     Past Surgical History:   Procedure Laterality Date   • ANKLE OPEN REDUCTION INTERNAL FIXATION Right 7/12/2017    Procedure: HARDWARE REMOVAL WITH FIXATION MEDIAL MALLELOUS RIGHT ;  Surgeon: Sriram De Luna MD;  Location: [facility];  Service:    • ANKLE SURGERY Right     X4 WITH HARDWARE PLACEMENT   • APPENDECTOMY     • BLADDER SUSPENSION     • CATARACT EXTRACTION, BILATERAL     • CHOLECYSTECTOMY     • CORONARY ARTERY BYPASS GRAFT  2011 4 VESSEL   • HYSTERECTOMY     • ORIF ANKLE FRACTURE Right    • PARTIAL HIP ARTHROPLASTY Right 2014   • SPINE SURGERY  03/2016    UNKNOWN   • TONSILLECTOMY     • TOTAL HIP ARTHROPLASTY Right 2016     Social History     Occupational History   • Not on file.     Social History Main Topics   • Smoking  status: Former Smoker     Packs/day: 1.00     Years: 30.00     Types: Cigarettes     Quit date: 2011   • Smokeless tobacco: Not on file   • Alcohol use No   • Drug use: No   • Sexual activity: Defer    Social History     Social History Narrative     Family History   Problem Relation Age of Onset   • Malig Hyperthermia Neg Hx        Allergies:   Allergies   Allergen Reactions   • Aspirin Swelling     EYE SWELLING   • Iodinated Diagnostic Agents Swelling and Other (See Comments)     HEART CATH DYE: SWELLING, KIDNEY PROBLEMS   • Penicillins Other (See Comments)     TACHYCARDIA, LIP NUMBNESS   • Sulfa Antibiotics Nausea And Vomiting       Medications:   Home Medications:  Prescriptions Prior to Admission   Medication Sig Dispense Refill Last Dose   • amLODIPine (NORVASC) 10 MG tablet Take 10 mg by mouth Daily.   9/7/2017 at Unknown time   • atorvastatin (LIPITOR) 40 MG tablet Take 40 mg by mouth Daily.   9/7/2017 at Unknown time   • carvedilol (COREG) 3.125 MG tablet Take 3.125 mg by mouth 2 (Two) Times a Day With Meals.   9/7/2017 at Unknown time   • dorzolamide-timolol (COSOPT) 22.3-6.8 MG/ML ophthalmic solution Administer 1 drop to both eyes 2 (Two) Times a Day.   9/7/2017 at Unknown time   • furosemide (LASIX) 40 MG tablet Take 40 mg by mouth Daily.   9/7/2017 at Unknown time   • Insulin Glargine (LANTUS SOLOSTAR) 100 UNIT/ML injection pen Inject 70 Units under the skin Every Night.   9/7/2017 at Unknown time   • levothyroxine (SYNTHROID, LEVOTHROID) 88 MCG tablet Take 88 mcg by mouth Every Night.   9/7/2017 at Unknown time   • lisinopril (PRINIVIL,ZESTRIL) 40 MG tablet Take 40 mg by mouth Daily.   9/7/2017 at Unknown time   • metFORMIN (GLUCOPHAGE) 1000 MG tablet Take 1,000 mg by mouth Daily With Breakfast.   9/7/2017 at Unknown time   • omeprazole (priLOSEC) 20 MG capsule Take 20 mg by mouth Daily.   9/7/2017 at Unknown time   • oxyCODONE-acetaminophen (PERCOCET) 7.5-325 MG per tablet Take 1 tablet by mouth Every  4 (Four) Hours As Needed.   9/7/2017 at Unknown time   • potassium chloride (K-DUR,KLOR-CON) 10 MEQ CR tablet Take 10 mEq by mouth Daily.   9/7/2017 at Unknown time   • venlafaxine (EFFEXOR) 75 MG tablet Take 75 mg by mouth Every Night.   9/7/2017 at Unknown time   • vitamin C (ASCORBIC ACID) 500 MG tablet Take 500 mg by mouth Daily. Stopping preop, last dose: 9/5/17 9/7/2017 at Unknown time   • warfarin (COUMADIN) 2 MG tablet Take 2 mg by mouth Daily. HELD FOR OR LAST DOSE 9/1/17 9/1/2017       Current Medications:  Scheduled Meds:   Continuous Infusions:  lactated ringers 9 mL/hr Last Rate: 9 mL/hr (09/08/17 0735)     PRN Meds:.•  bupivacaine-EPINEPHrine  •  fentanyl  •  midazolam **OR** midazolam  •  sodium chloride    Review of Systems:   A 12 point system review was reviewed with the patient and from the chart  and is negative except for ankle pain.    Physical Exam: 77 y.o. female   Vitals:    09/08/17 0653 09/08/17 0747   BP: 133/52    BP Location: Left arm    Patient Position: Sitting    Pulse: 64    Resp: 16    Temp: 97.6 °F (36.4 °C)    TempSrc: Oral    SpO2: 95% 96%   Weight: 155 lb (70.3 kg)         Gait: Not tested  Mental/HEENT/cardio/skin: The patient's general appearance was well-nourished, well-hydrated, no acute distress.  Orientation was alert and oriented ×3.  The patient's mood was normal.   Pulmonary exam shows normal late exchange, no labored breathing, or shortness of breath.    The skin exam showed normal temperature and color in the area of examination.  Extremities: right ankle with STS, healed incisions, no erythema  Pulses:  Pulses palpable and equal bilaterally    Diagnostic Tests:    Admission on 09/08/2017   Component Date Value Ref Range Status   • Glucose 09/08/2017 149* 70 - 130 mg/dL Final     No results found for: CRYSTAL]  No results found for: CULTURE]  No results found for: URICACID]urgery  No results found.      Assessment:  Patient Active Problem List   Diagnosis   •  Fracture, ankle   • Difficulty walking       Plan:  The patient voiced understanding of the risks, benefits, and alternative forms of treatment that were discussed including but not limited to infection, need for further surgery . The patient consents to proceed with hardware revision.    Date: 9/8/2017    Sriram De Luna MD    CC: Cedric Ramirez MD; MD Sriram Bentley MD

## 2017-09-08 NOTE — ANESTHESIA POSTPROCEDURE EVALUATION
Patient: Cele Owens    Procedure Summary     Date Anesthesia Start Anesthesia Stop Room / Location    09/08/17 0953 1132  [facility] /  [facility]       Procedure Diagnosis Surgeon Provider    RT ANKLE SCREW REVISION AND DEBRIDMENT OF NONUNION AND REVISION AND FIXATION  (Right Ankle) No diagnosis on file. MD Travis Lancaster MD          Anesthesia Type: general  Last vitals  BP   107/65 (09/08/17 1306)    Temp        Pulse   78 (09/08/17 1306)   Resp   18 (09/08/17 1306)    SpO2   97 % (09/08/17 1306)      Post Anesthesia Care and Evaluation    Patient location during evaluation: bedside  Patient participation: complete - patient participated  Level of consciousness: awake  Pain score: 2  Pain management: adequate  Airway patency: patent  Anesthetic complications: No anesthetic complications  PONV Status: none  Cardiovascular status: acceptable  Respiratory status: acceptable  Hydration status: acceptable    Comments: /65  Pulse 78  Temp 36.3 °C (97.4 °F) (Temporal Artery )   Resp 18  Wt 155 lb (70.3 kg)  SpO2 97%  BMI 27.46 kg/m2

## 2017-09-08 NOTE — OP NOTE
Operative  Note    Patient: Cele Owens    YOB: 1940    Medical Record Number: [id number]    Attending Physician: Sriram De Luna MD    Primary Care Physician: Cedric Ramirez MD    Date of Service: 9/8/2017     PREOPERATIVE DIAGNOSIS:   Failure of hardware, non-union right ankle fracture    POSTOPERATIVE DIAGNOSIS:   Same    PROCEDURE PERFORMED: RT ANKLE SCREW REVISION  With hardware removal and revision fixation/debridement non-union  CPT - 70418    ANESTHESIA: General  Anesthesiologist: Travis Vickers MD  CRNA: Rose Marie Joiner CRNA       ESTIMATED BLOOD LOSS:  15 mL  .   SPECIMENS: * No orders in the log *      COMPLICATIONS: Nil.     DRAINS:          SURGEON:  Sriram De Luna M.D.    ASSISTANTS:  None        INDICATIONS: The patient is a 77 y.o. female  who presented to the office with a history of injury and ankle fracture. She was found to have loss of fixation with at least one loose prominent screw.  The patient was evaluated and scheduled for surgery . Options were discussed.    PROCEDURE: The patient has been transferred to [facility] operating room. Preoperative antibiotic Kefzol 1 gm Intravenously  were given prior to the placement of tourniquet . After achieving adequate anesthesia, a well-padded tourniquet was placed over the proximal aspect of the operative thigh. The leg was prepped and draped in the usual sterile fashion. Surgical timeout was done. Correct patient surgical side and site were identified. Tourniquet was not inflated.     A skin incision was made on the medial malleolus centering over the fracture site. The skin and subcutaneous tissue were incised and the fracture non-union site was identified. Screws were identified, and all 3 were found to be loose. They were easily removed.  The fibrous tissue was debrided with a rongeur, and the medial malleolus fracture reduced and temporarily fixed with one unthreaded K wire. The reduction of  the medial malleolus was found to be satisfactory. The K-wire was measured and a screw advanced with C-arm control. A second and third screw were felt to be necessary, and these were placed through the medial talus and advanced into the tibia and partial fibula. Screws of measured length were inserted and the fixation visualized and confirmed. All implants and the bone were stable to inspection and manipulation.     The incisions were thoroughly irrigated and were closed in layers.  Sterile dressings were placed and the patient was transferred to the recovery room in a stable condition with a well-padded posterior splint.  The patient tolerated the procedure well.  There were no complications.  The patient is being prepared for discharge home with instructions to keep the operated lower extremity elevated above heart level, but observation stay may be necessary for pain control with IV medication.  The patient  will remain nonweightbearing on the operated lower extremity.  The patient will follow up with me in the office in 10-12 days  and will call [phone] for appointment.    .    The patient will notify me immediately if they notice any temperature greater than 101°F or increased pain or any drainage from her incision in the splint.    I discussed the satisfactory performance of the procedure with the patient's family and discussed with them The postoperative management.     9/8/2017  11:41 AM  Sriram De Luna MD    CC: Cedric Ramirez MD; MD Sriram Bentley MD

## 2017-09-08 NOTE — PLAN OF CARE
Problem: Perioperative Period (Adult)  Goal: Signs and Symptoms of Listed Potential Problems Will be Absent or Manageable (Perioperative Period)  Outcome: Outcome(s) achieved Date Met:  09/08/17 09/08/17 1323   Perioperative Period   Problems Assessed (Perioperative Period) all   Problems Present (Perioperative Period) none

## 2017-09-08 NOTE — PLAN OF CARE
Problem: Patient Care Overview (Adult)  Goal: Discharge Needs Assessment  Outcome: Ongoing (interventions implemented as appropriate)    09/08/17 0656   Discharge Needs Assessment   Concerns To Be Addressed no discharge needs identified   Discharge Disposition home or self-care   Self-Care   Equipment Currently Used at Home wheelchair   Living Environment   Transportation Available car;family or friend will provide

## 2017-09-08 NOTE — PLAN OF CARE
Problem: Patient Care Overview (Adult)  Goal: Plan of Care Review  Outcome: Outcome(s) achieved Date Met:  09/08/17 09/08/17 1330   Coping/Psychosocial Response Interventions   Plan Of Care Reviewed With patient;spouse   Patient Care Overview   Progress improving   Outcome Evaluation   Outcome Summary/Follow up Plan pt ready for discharge       Goal: Adult Individualization and Mutuality  Outcome: Outcome(s) achieved Date Met:  09/08/17  Goal: Discharge Needs Assessment  Outcome: Outcome(s) achieved Date Met:  09/08/17 09/08/17 1330   Discharge Needs Assessment   Concerns To Be Addressed no discharge needs identified   Equipment Needed After Discharge wheelchair

## 2017-09-08 NOTE — PLAN OF CARE
Problem: Patient Care Overview (Adult)  Goal: Plan of Care Review  Outcome: Ongoing (interventions implemented as appropriate)    09/08/17 1231   Coping/Psychosocial Response Interventions   Plan Of Care Reviewed With patient   Patient Care Overview   Progress improving   Outcome Evaluation   Outcome Summary/Follow up Plan vss, pain tolerable       Goal: Adult Individualization and Mutuality  Outcome: Ongoing (interventions implemented as appropriate)  Goal: Discharge Needs Assessment  Outcome: Ongoing (interventions implemented as appropriate)    Problem: Perioperative Period (Adult)  Goal: Signs and Symptoms of Listed Potential Problems Will be Absent or Manageable (Perioperative Period)  Outcome: Ongoing (interventions implemented as appropriate)

## 2017-09-08 NOTE — PLAN OF CARE
Problem: Perioperative Period (Adult)  Goal: Signs and Symptoms of Listed Potential Problems Will be Absent or Manageable (Perioperative Period)  Outcome: Ongoing (interventions implemented as appropriate)    09/08/17 0656   Perioperative Period   Problems Assessed (Perioperative Period) all   Problems Present (Perioperative Period) none

## 2017-09-08 NOTE — ANESTHESIA PROCEDURE NOTES
Airway  Urgency: elective    Date/Time: 9/8/2017 9:58 AM    General Information and Staff    Patient location during procedure: OR  Anesthesiologist: RENDER, RUSSEL RAY  CRNA: LOREE QUIROZ    Indications and Patient Condition  Indications for airway management: airway protection    Preoxygenated: yes  Mask difficulty assessment: 1 - vent by mask    Final Airway Details  Final airway type: supraglottic airway      Successful airway: unique  Size 4  Airway Seal Pressure (cm H2O): 60    Number of attempts at approach: 1    Additional Comments  SIVI.  OU TAPED.  LMA PLACED WITH EASE.  APPEARS ATRAUMATIC.  +ETCO.  +SV

## 2017-09-08 NOTE — PLAN OF CARE
Problem: Fall Risk (Adult)  Goal: Absence of Falls  Outcome: Outcome(s) achieved Date Met:  09/08/17 09/08/17 3703   Fall Risk (Adult)   Absence of Falls making progress toward outcome

## 2019-02-05 ENCOUNTER — ON CAMPUS - OUTPATIENT (AMBULATORY)
Dept: URBAN - METROPOLITAN AREA HOSPITAL 108 | Facility: HOSPITAL | Age: 79
End: 2019-02-05
Payer: MEDICARE

## 2019-02-05 DIAGNOSIS — K57.30 DIVERTICULOSIS OF LARGE INTESTINE WITHOUT PERFORATION OR ABS: ICD-10-CM

## 2019-02-05 DIAGNOSIS — Z86.010 PERSONAL HISTORY OF COLONIC POLYPS: ICD-10-CM

## 2019-02-05 DIAGNOSIS — D17.5 BENIGN LIPOMATOUS NEOPLASM OF INTRA-ABDOMINAL ORGANS: ICD-10-CM

## 2019-02-05 PROCEDURE — G0105 COLORECTAL SCRN; HI RISK IND: HCPCS | Performed by: INTERNAL MEDICINE

## (undated) DEVICE — GLV SURG BIOGEL LTX PF 8

## (undated) DEVICE — SUT VIC 2/0 CT2 27IN J269H

## (undated) DEVICE — T-DRAPE,EXTREMITY,STERILE: Brand: MEDLINE

## (undated) DEVICE — TOWEL,OR,DSP,ST,BLUE,STD,4/PK,20PK/CS: Brand: MEDLINE

## (undated) DEVICE — ANTIBACTERIAL UNDYED BRAIDED (POLYGLACTIN 910), SYNTHETIC ABSORBABLE SUTURE: Brand: COATED VICRYL

## (undated) DEVICE — BNDG ESMARK 4IN 12FT LF STRL BLU

## (undated) DEVICE — DRAPE,U/ SHT,SPLIT,PLAS,STERIL: Brand: MEDLINE

## (undated) DEVICE — PK ORTHO MAJ 40

## (undated) DEVICE — SUT VIC 0 CT1 36IN J946H

## (undated) DEVICE — BIT DRL QC SS 2.5X110MM

## (undated) DEVICE — DISPOSABLE TOURNIQUET CUFF SINGLE BLADDER, SINGLE PORT AND QUICK CONNECT CONNECTOR: Brand: COLOR CUFF

## (undated) DEVICE — PAD,ABDOMINAL,8"X10",ST,LF: Brand: MEDLINE

## (undated) DEVICE — WEBRIL COTTON UNDERCAST PADDING: Brand: WEBRIL

## (undated) DEVICE — BNDG ELAS ELITE V/CLOSE 6IN 5YD LF STRL

## (undated) DEVICE — SOL ISO/ALC RUB 70PCT 4OZ

## (undated) DEVICE — ENCORE® LATEX ORTHO SIZE 8, STERILE LATEX POWDER-FREE SURGICAL GLOVE: Brand: ENCORE

## (undated) DEVICE — SUT ETHLN 3/0 PSL BLK MONO SA 30IN 1691H

## (undated) DEVICE — INTENDED FOR TISSUE SEPARATION, AND OTHER PROCEDURES THAT REQUIRE A SHARP SURGICAL BLADE TO PUNCTURE OR CUT.: Brand: BARD-PARKER ® DISPOSABLE SCALPELS

## (undated) DEVICE — WEBRIL* CAST PADDING: Brand: DEROYAL

## (undated) DEVICE — SPNG GZ WOVN 4X4IN 12PLY 10/BX STRL

## (undated) DEVICE — GLV SURG TRIUMPH CLASSIC PF LTX 8 STRL

## (undated) DEVICE — UNDERCAST PADDING: Brand: DEROYAL

## (undated) DEVICE — IMPLANTABLE DEVICE
Type: IMPLANTABLE DEVICE | Site: ANKLE | Status: NON-FUNCTIONAL
Removed: 2017-09-08

## (undated) DEVICE — DRP C/ARMOR

## (undated) DEVICE — BNDG ELAS ELITE V/CLOSE 4IN 5YD LF STRL

## (undated) DEVICE — OCCLUSIVE GAUZE STRIP,3% BISMUTH TRIBROMOPHENATE IN PETROLATUM BLEND: Brand: XEROFORM

## (undated) DEVICE — 3M™ STERI-STRIP™ REINFORCED ADHESIVE SKIN CLOSURES, R1547, 1/2 IN X 4 IN (12 MM X 100 MM), 6 STRIPS/ENVELOPE: Brand: 3M™ STERI-STRIP™

## (undated) DEVICE — APPL CHLORAPREP W/TINT 26ML ORNG

## (undated) DEVICE — GOWN,PREVENTION PLUS,XLONG/XLARGE,STRL: Brand: MEDLINE

## (undated) DEVICE — PAD GRND REM POLYHESIVE A/ DISP

## (undated) DEVICE — SPLNT PLSTR ORTHO 5IN